# Patient Record
Sex: MALE | Employment: UNEMPLOYED | URBAN - METROPOLITAN AREA
[De-identification: names, ages, dates, MRNs, and addresses within clinical notes are randomized per-mention and may not be internally consistent; named-entity substitution may affect disease eponyms.]

---

## 2017-03-02 ENCOUNTER — HOSPITAL ENCOUNTER (EMERGENCY)
Age: 32
Discharge: LEFT BEFORE DISCHARGE | End: 2017-03-02
Attending: EMERGENCY MEDICINE
Payer: SELF-PAY

## 2017-03-02 DIAGNOSIS — Z53.21 PATIENT LEFT AFTER TRIAGE: Primary | ICD-10-CM

## 2017-03-02 PROCEDURE — 75810000275 HC EMERGENCY DEPT VISIT NO LEVEL OF CARE

## 2017-03-03 NOTE — ED PROVIDER NOTES
HPI     No past medical history on file. No past surgical history on file. No family history on file. Social History     Social History    Marital status: SINGLE     Spouse name: N/A    Number of children: N/A    Years of education: N/A     Occupational History    Not on file. Social History Main Topics    Smoking status: Current Every Day Smoker     Packs/day: 0.25    Smokeless tobacco: Not on file    Alcohol use Yes    Drug use: No    Sexual activity: Not on file     Other Topics Concern    Not on file     Social History Narrative    No narrative on file         ALLERGIES: Review of patient's allergies indicates no known allergies. Review of Systems    There were no vitals filed for this visit.          Physical Exam     MDM  ED Course       Procedures                       Left without being seen  Lissy Lozano MD

## 2019-11-06 ENCOUNTER — APPOINTMENT (OUTPATIENT)
Dept: GENERAL RADIOLOGY | Age: 34
DRG: 644 | End: 2019-11-06
Attending: PHYSICIAN ASSISTANT
Payer: SELF-PAY

## 2019-11-06 ENCOUNTER — HOSPITAL ENCOUNTER (INPATIENT)
Age: 34
LOS: 3 days | Discharge: HOME | DRG: 644 | End: 2019-11-09
Attending: EMERGENCY MEDICINE | Admitting: INTERNAL MEDICINE
Payer: SELF-PAY

## 2019-11-06 DIAGNOSIS — T50.905A ADVERSE EFFECT OF OVER-THE-COUNTER MEDICATION, INITIAL ENCOUNTER: ICD-10-CM

## 2019-11-06 DIAGNOSIS — E16.2 HYPOGLYCEMIA: Primary | ICD-10-CM

## 2019-11-06 PROBLEM — R77.8 ELEVATED TROPONIN: Status: ACTIVE | Noted: 2019-11-06

## 2019-11-06 PROBLEM — R74.8 ELEVATED CK: Status: ACTIVE | Noted: 2019-11-06

## 2019-11-06 PROBLEM — R74.8 ELEVATED CK-MB LEVEL: Status: ACTIVE | Noted: 2019-11-06

## 2019-11-06 PROBLEM — R56.9 SEIZURE-LIKE ACTIVITY (HCC): Status: ACTIVE | Noted: 2019-11-06

## 2019-11-06 LAB
ALBUMIN SERPL-MCNC: 3.8 G/DL (ref 3.4–5)
ALBUMIN/GLOB SERPL: 1.1 {RATIO} (ref 0.8–1.7)
ALP SERPL-CCNC: 55 U/L (ref 45–117)
ALT SERPL-CCNC: 25 U/L (ref 16–61)
AMPHET UR QL SCN: NEGATIVE
ANION GAP SERPL CALC-SCNC: 3 MMOL/L (ref 3–18)
APPEARANCE UR: CLEAR
AST SERPL-CCNC: 20 U/L (ref 10–38)
BACTERIA URNS QL MICRO: NEGATIVE /HPF
BARBITURATES UR QL SCN: NEGATIVE
BASOPHILS # BLD: 0 K/UL (ref 0–0.1)
BASOPHILS NFR BLD: 0 % (ref 0–2)
BENZODIAZ UR QL: NEGATIVE
BILIRUB SERPL-MCNC: 0.5 MG/DL (ref 0.2–1)
BILIRUB UR QL: NEGATIVE
BUN SERPL-MCNC: 7 MG/DL (ref 7–18)
BUN/CREAT SERPL: 7 (ref 12–20)
CALCIUM SERPL-MCNC: 9.4 MG/DL (ref 8.5–10.1)
CANNABINOIDS UR QL SCN: POSITIVE
CHLORIDE SERPL-SCNC: 106 MMOL/L (ref 100–111)
CK MB CFR SERPL CALC: 0.8 % (ref 0–4)
CK MB CFR SERPL CALC: 0.8 % (ref 0–4)
CK MB CFR SERPL CALC: 1 % (ref 0–4)
CK MB SERPL-MCNC: 3.6 NG/ML (ref 5–25)
CK MB SERPL-MCNC: 4.7 NG/ML (ref 5–25)
CK MB SERPL-MCNC: 9.1 NG/ML (ref 5–25)
CK SERPL-CCNC: 1178 U/L (ref 39–308)
CK SERPL-CCNC: 345 U/L (ref 39–308)
CK SERPL-CCNC: 576 U/L (ref 39–308)
CO2 SERPL-SCNC: 31 MMOL/L (ref 21–32)
COCAINE UR QL SCN: NEGATIVE
COLOR UR: YELLOW
CREAT SERPL-MCNC: 1.01 MG/DL (ref 0.6–1.3)
DIFFERENTIAL METHOD BLD: ABNORMAL
EOSINOPHIL # BLD: 0 K/UL (ref 0–0.4)
EOSINOPHIL NFR BLD: 0 % (ref 0–5)
EPITH CASTS URNS QL MICRO: NEGATIVE /LPF (ref 0–5)
ERYTHROCYTE [DISTWIDTH] IN BLOOD BY AUTOMATED COUNT: 14.2 % (ref 11.6–14.5)
ETHANOL SERPL-MCNC: <3 MG/DL (ref 0–3)
GLOBULIN SER CALC-MCNC: 3.5 G/DL (ref 2–4)
GLUCOSE BLD STRIP.AUTO-MCNC: 102 MG/DL (ref 70–110)
GLUCOSE BLD STRIP.AUTO-MCNC: 107 MG/DL (ref 70–110)
GLUCOSE BLD STRIP.AUTO-MCNC: 107 MG/DL (ref 70–110)
GLUCOSE BLD STRIP.AUTO-MCNC: 108 MG/DL (ref 70–110)
GLUCOSE BLD STRIP.AUTO-MCNC: 147 MG/DL (ref 70–110)
GLUCOSE BLD STRIP.AUTO-MCNC: 73 MG/DL (ref 70–110)
GLUCOSE BLD STRIP.AUTO-MCNC: 78 MG/DL (ref 70–110)
GLUCOSE BLD STRIP.AUTO-MCNC: 85 MG/DL (ref 70–110)
GLUCOSE BLD STRIP.AUTO-MCNC: 86 MG/DL (ref 70–110)
GLUCOSE BLD STRIP.AUTO-MCNC: 88 MG/DL (ref 70–110)
GLUCOSE BLD STRIP.AUTO-MCNC: 89 MG/DL (ref 70–110)
GLUCOSE SERPL-MCNC: 47 MG/DL (ref 74–99)
GLUCOSE UR STRIP.AUTO-MCNC: 500 MG/DL
HCT VFR BLD AUTO: 43.9 % (ref 36–48)
HDSCOM,HDSCOM: ABNORMAL
HGB BLD-MCNC: 15.1 G/DL (ref 13–16)
HGB UR QL STRIP: NEGATIVE
KETONES UR QL STRIP.AUTO: NEGATIVE MG/DL
LEUKOCYTE ESTERASE UR QL STRIP.AUTO: NEGATIVE
LYMPHOCYTES # BLD: 0.8 K/UL (ref 0.9–3.6)
LYMPHOCYTES NFR BLD: 10 % (ref 21–52)
MCH RBC QN AUTO: 29.2 PG (ref 24–34)
MCHC RBC AUTO-ENTMCNC: 34.4 G/DL (ref 31–37)
MCV RBC AUTO: 84.7 FL (ref 74–97)
METHADONE UR QL: NEGATIVE
MONOCYTES # BLD: 0.6 K/UL (ref 0.05–1.2)
MONOCYTES NFR BLD: 7 % (ref 3–10)
NEUTS SEG # BLD: 7.3 K/UL (ref 1.8–8)
NEUTS SEG NFR BLD: 83 % (ref 40–73)
NITRITE UR QL STRIP.AUTO: NEGATIVE
OPIATES UR QL: NEGATIVE
PCP UR QL: NEGATIVE
PH UR STRIP: 6 [PH] (ref 5–8)
PLATELET # BLD AUTO: 278 K/UL (ref 135–420)
PMV BLD AUTO: 9.8 FL (ref 9.2–11.8)
POTASSIUM SERPL-SCNC: 4 MMOL/L (ref 3.5–5.5)
PROT SERPL-MCNC: 7.3 G/DL (ref 6.4–8.2)
PROT UR STRIP-MCNC: 100 MG/DL
RBC # BLD AUTO: 5.18 M/UL (ref 4.7–5.5)
RBC #/AREA URNS HPF: NORMAL /HPF (ref 0–5)
SODIUM SERPL-SCNC: 140 MMOL/L (ref 136–145)
SP GR UR REFRACTOMETRY: 1.01 (ref 1–1.03)
TROPONIN I SERPL-MCNC: 0.11 NG/ML (ref 0–0.04)
TROPONIN I SERPL-MCNC: 0.26 NG/ML (ref 0–0.04)
TROPONIN I SERPL-MCNC: 0.28 NG/ML (ref 0–0.04)
UROBILINOGEN UR QL STRIP.AUTO: 0.2 EU/DL (ref 0.2–1)
WBC # BLD AUTO: 8.7 K/UL (ref 4.6–13.2)
WBC URNS QL MICRO: NORMAL /HPF (ref 0–4)

## 2019-11-06 PROCEDURE — 81001 URINALYSIS AUTO W/SCOPE: CPT

## 2019-11-06 PROCEDURE — 96365 THER/PROPH/DIAG IV INF INIT: CPT

## 2019-11-06 PROCEDURE — 80307 DRUG TEST PRSMV CHEM ANLYZR: CPT

## 2019-11-06 PROCEDURE — 93005 ELECTROCARDIOGRAM TRACING: CPT

## 2019-11-06 PROCEDURE — 74011250637 HC RX REV CODE- 250/637: Performed by: PHYSICIAN ASSISTANT

## 2019-11-06 PROCEDURE — 85025 COMPLETE CBC W/AUTO DIFF WBC: CPT

## 2019-11-06 PROCEDURE — 74011000258 HC RX REV CODE- 258: Performed by: PHYSICIAN ASSISTANT

## 2019-11-06 PROCEDURE — 74011000250 HC RX REV CODE- 250: Performed by: EMERGENCY MEDICINE

## 2019-11-06 PROCEDURE — 80053 COMPREHEN METABOLIC PANEL: CPT

## 2019-11-06 PROCEDURE — 74011250636 HC RX REV CODE- 250/636: Performed by: INTERNAL MEDICINE

## 2019-11-06 PROCEDURE — 99285 EMERGENCY DEPT VISIT HI MDM: CPT

## 2019-11-06 PROCEDURE — 74011000250 HC RX REV CODE- 250: Performed by: PHYSICIAN ASSISTANT

## 2019-11-06 PROCEDURE — 74011250636 HC RX REV CODE- 250/636: Performed by: PHYSICIAN ASSISTANT

## 2019-11-06 PROCEDURE — 82550 ASSAY OF CK (CPK): CPT

## 2019-11-06 PROCEDURE — 96375 TX/PRO/DX INJ NEW DRUG ADDON: CPT

## 2019-11-06 PROCEDURE — 71045 X-RAY EXAM CHEST 1 VIEW: CPT

## 2019-11-06 PROCEDURE — 65660000004 HC RM CVT STEPDOWN

## 2019-11-06 PROCEDURE — 82962 GLUCOSE BLOOD TEST: CPT

## 2019-11-06 PROCEDURE — 74011000250 HC RX REV CODE- 250

## 2019-11-06 PROCEDURE — 36415 COLL VENOUS BLD VENIPUNCTURE: CPT

## 2019-11-06 PROCEDURE — 74011000258 HC RX REV CODE- 258: Performed by: INTERNAL MEDICINE

## 2019-11-06 RX ORDER — MAGNESIUM SULFATE 100 %
4 CRYSTALS MISCELLANEOUS AS NEEDED
Status: DISCONTINUED | OUTPATIENT
Start: 2019-11-06 | End: 2019-11-09 | Stop reason: HOSPADM

## 2019-11-06 RX ORDER — HYDROCORTISONE SODIUM SUCCINATE 100 MG/2ML
100 INJECTION, POWDER, FOR SOLUTION INTRAMUSCULAR; INTRAVENOUS ONCE
Status: COMPLETED | OUTPATIENT
Start: 2019-11-06 | End: 2019-11-06

## 2019-11-06 RX ORDER — GUAIFENESIN 100 MG/5ML
324 LIQUID (ML) ORAL
Status: COMPLETED | OUTPATIENT
Start: 2019-11-06 | End: 2019-11-06

## 2019-11-06 RX ORDER — DEXTROSE 50 % IN WATER (D50W) INTRAVENOUS SYRINGE
25
Status: COMPLETED | OUTPATIENT
Start: 2019-11-06 | End: 2019-11-06

## 2019-11-06 RX ORDER — ENOXAPARIN SODIUM 100 MG/ML
40 INJECTION SUBCUTANEOUS EVERY 24 HOURS
Status: DISCONTINUED | OUTPATIENT
Start: 2019-11-07 | End: 2019-11-09 | Stop reason: HOSPADM

## 2019-11-06 RX ORDER — NALOXONE HYDROCHLORIDE 0.4 MG/ML
0.4 INJECTION, SOLUTION INTRAMUSCULAR; INTRAVENOUS; SUBCUTANEOUS AS NEEDED
Status: DISCONTINUED | OUTPATIENT
Start: 2019-11-06 | End: 2019-11-09 | Stop reason: HOSPADM

## 2019-11-06 RX ORDER — DEXTROSE MONOHYDRATE 100 MG/ML
150 INJECTION, SOLUTION INTRAVENOUS CONTINUOUS
Status: DISCONTINUED | OUTPATIENT
Start: 2019-11-06 | End: 2019-11-08

## 2019-11-06 RX ORDER — ACETAMINOPHEN 325 MG/1
650 TABLET ORAL
Status: DISCONTINUED | OUTPATIENT
Start: 2019-11-06 | End: 2019-11-09 | Stop reason: HOSPADM

## 2019-11-06 RX ORDER — DEXTROSE MONOHYDRATE 100 MG/ML
125-250 INJECTION, SOLUTION INTRAVENOUS AS NEEDED
Status: DISCONTINUED | OUTPATIENT
Start: 2019-11-06 | End: 2019-11-09 | Stop reason: HOSPADM

## 2019-11-06 RX ORDER — DEXTROSE 50 % IN WATER (D50W) INTRAVENOUS SYRINGE
25
Status: DISCONTINUED | OUTPATIENT
Start: 2019-11-06 | End: 2019-11-06

## 2019-11-06 RX ORDER — WATER FOR INJECTION,STERILE
VIAL (ML) INJECTION
Status: COMPLETED
Start: 2019-11-06 | End: 2019-11-06

## 2019-11-06 RX ORDER — DOCUSATE SODIUM 100 MG/1
100 CAPSULE, LIQUID FILLED ORAL
Status: DISCONTINUED | OUTPATIENT
Start: 2019-11-06 | End: 2019-11-09 | Stop reason: HOSPADM

## 2019-11-06 RX ORDER — DEXTROSE 50 % IN WATER (D50W) INTRAVENOUS SYRINGE
25-50 AS NEEDED
Status: DISCONTINUED | OUTPATIENT
Start: 2019-11-06 | End: 2019-11-06

## 2019-11-06 RX ORDER — GUAIFENESIN 100 MG/5ML
81 LIQUID (ML) ORAL DAILY
Status: DISCONTINUED | OUTPATIENT
Start: 2019-11-07 | End: 2019-11-07

## 2019-11-06 RX ORDER — ONDANSETRON 2 MG/ML
4 INJECTION INTRAMUSCULAR; INTRAVENOUS
Status: DISCONTINUED | OUTPATIENT
Start: 2019-11-06 | End: 2019-11-09 | Stop reason: HOSPADM

## 2019-11-06 RX ORDER — ENOXAPARIN SODIUM 100 MG/ML
1 INJECTION SUBCUTANEOUS
Status: DISCONTINUED | OUTPATIENT
Start: 2019-11-06 | End: 2019-11-06

## 2019-11-06 RX ORDER — ENOXAPARIN SODIUM 100 MG/ML
80 INJECTION SUBCUTANEOUS
Status: COMPLETED | OUTPATIENT
Start: 2019-11-06 | End: 2019-11-06

## 2019-11-06 RX ADMIN — DEXTROSE MONOHYDRATE 150 ML/HR: 10 INJECTION, SOLUTION INTRAVENOUS at 16:35

## 2019-11-06 RX ADMIN — WATER 1 ML: 1 INJECTION INTRAMUSCULAR; INTRAVENOUS; SUBCUTANEOUS at 23:00

## 2019-11-06 RX ADMIN — DEXTROSE 50 % IN WATER (D50W) INTRAVENOUS SYRINGE 25 G: at 16:23

## 2019-11-06 RX ADMIN — HYDROCORTISONE SODIUM SUCCINATE 100 MG: 100 INJECTION, POWDER, FOR SOLUTION INTRAMUSCULAR; INTRAVENOUS at 22:45

## 2019-11-06 RX ADMIN — DEXTROSE 50 % IN WATER (D50W) INTRAVENOUS SYRINGE 25 G: at 18:50

## 2019-11-06 RX ADMIN — ENOXAPARIN SODIUM 80 MG: 80 INJECTION SUBCUTANEOUS at 19:10

## 2019-11-06 RX ADMIN — ASPIRIN 81 MG 324 MG: 81 TABLET ORAL at 19:10

## 2019-11-06 RX ADMIN — GLUCAGON HYDROCHLORIDE 1 MG: 1 INJECTION, POWDER, FOR SOLUTION INTRAMUSCULAR; INTRAVENOUS; SUBCUTANEOUS at 22:37

## 2019-11-06 RX ADMIN — DEXTROSE MONOHYDRATE 200 ML/HR: 10 INJECTION, SOLUTION INTRAVENOUS at 22:00

## 2019-11-06 NOTE — ED NOTES
TRANSFER - OUT REPORT:    Verbal report given to Aurelio Connelly RN(name) on Lokesh Torres  being transferred to [facility](unit) for routine progression of care       Report consisted of patients Situation, Background, Assessment and   Recommendations(SBAR). Information from the following report(s) SBAR, ED Summary, Intake/Output and MAR was reviewed with the receiving nurse. Lines:   Peripheral IV 11/06/19 Left Antecubital (Active)   Site Assessment Clean, dry, & intact 11/6/2019  4:30 PM   Phlebitis Assessment 0 11/6/2019  4:30 PM   Infiltration Assessment 0 11/6/2019  4:30 PM   Dressing Status Clean, dry, & intact 11/6/2019  4:30 PM   Dressing Type Transparent 11/6/2019  4:30 PM   Hub Color/Line Status Pink 11/6/2019  4:30 PM       Peripheral IV 11/06/19 Right Antecubital (Active)   Site Assessment Clean, dry, & intact 11/6/2019  4:31 PM   Phlebitis Assessment 0 11/6/2019  4:31 PM   Infiltration Assessment 0 11/6/2019  4:31 PM   Dressing Type Transparent 11/6/2019  4:31 PM   Hub Color/Line Status Pink 11/6/2019  4:31 PM        Opportunity for questions and clarification was provided.       Patient transported with:   Monitor

## 2019-11-06 NOTE — PROGRESS NOTES
[address] [facility]  Report received from Laura Felix RN. POC reviewed with pt at bedside. normosol infusing 125ml. hr  BG have been stable and pt has a CK waiting to be resulted. 1102  Pt ambulated over 500 ft without any difficulties. VSS stable t/o and no complaints of lightheadedness or dizziness. Pt educated on signs and symptoms of hypoglycemia. Will continue to monitor pt's BG Q4H.    1240  OK for pt to be discharged from hospital per Dr. Sreedhar De La Cruz. 1300  Discharge instructions reviewed with pt. Per Dr. Sreedhar De La Cruz, pt is to follow up with [facility] free clinic in [place]. Extensive education provided to patient and significant other regarding hypoglycemia and on Lovenox shot. Pt understanding and is ready for discharge. 1330  Pt awaiting transport to come .

## 2019-11-06 NOTE — ED PROVIDER NOTES
EMERGENCY DEPARTMENT HISTORY AND PHYSICAL EXAM    4:06 PM      Date: 2019  Patient Name: Felipe Gregg    History of Presenting Illness     Chief Complaint   Patient presents with    Low Blood Sugar       History Provided By: Patient, Patient's Wife and EMS    Chief Complaint: AMS, shaking, low blood sugar  Duration:  Minutes  Timing:  Acute  Location:   Quality: N/A  Severity: N/A  Modifying Factors: none  Associated Symptoms: denies any other associated signs or symptoms      Additional History (Context): Felipe Gregg is a 29 y.o. male who presents from home via EMS to the emergency department for evaluation of AMS and an episode of generalized shaking. Pt's significant other walked in the door to find him lying on the floor, trembling all over. He was unable to respond. Pt was found by EMS to have a BG in the 40s. After an amp of D50, BG climbed to 203 and patient became responsive. He has no history of DM. Pt now states that he took one over the counter male enhancement drug called V8 1.5-2 days ago. He reports he read online that the drug can cause low blood sugar, dizziness, and trembling, but states he took it anyway. He has been battling dizziness, lightheadedness, shakiness, and lack of appetite ever since taking the medication. He admits to regular THC use, but no other illicit drug use. Denies ETOH abuse. Pt also reports he has no medical problems and takes no other OTC or Rx medications. He has been healthy recently. He now has no physical complaints. PCP:  None      Past History     Past Medical History:  History reviewed. No pertinent past medical history. Past Surgical History:  History reviewed. No pertinent surgical history. Family History:  History reviewed. No pertinent family history.     Social History:  Social History     Tobacco Use    Smoking status: Former Smoker     Packs/day: 0.25     Last attempt to quit: 10/1/2019     Years since quittin.0    Smokeless tobacco: Never Used   Substance Use Topics    Alcohol use: Yes    Drug use: No       Allergies:  No Known Allergies      Review of Systems       Review of Systems   Constitutional: Negative for chills and fever. HENT: Negative for congestion, rhinorrhea and sore throat. Respiratory: Negative for cough and shortness of breath. Cardiovascular: Negative for chest pain. Gastrointestinal: Negative for abdominal pain, blood in stool, constipation, diarrhea, nausea and vomiting. Genitourinary: Negative for dysuria, frequency and hematuria. Musculoskeletal: Negative for back pain and myalgias. Skin: Negative for rash and wound. Neurological: Positive for dizziness, tremors, syncope, weakness and light-headedness. Negative for headaches. Psychiatric/Behavioral: Positive for confusion. All other systems reviewed and are negative. Physical Exam     Visit Vitals  /77   Pulse 61   Temp 97.3 °F (36.3 °C)   Resp 15   Ht 6' (1.829 m)   Wt 79.4 kg (175 lb)   SpO2 100%   BMI 23.73 kg/m²       Physical Exam   Constitutional: He is oriented to person, place, and time. He appears well-developed and well-nourished. No distress. HENT:   Head: Normocephalic and atraumatic. Nose: Nose normal.   Mouth/Throat: Oropharynx is clear and moist. No oropharyngeal exudate. Eyes: Pupils are equal, round, and reactive to light. Conjunctivae and EOM are normal. Right eye exhibits no discharge. Left eye exhibits no discharge. Neck: Normal range of motion. Neck supple. No thyromegaly present. Cardiovascular: Normal rate, regular rhythm and normal heart sounds. Exam reveals no gallop and no friction rub. No murmur heard. Pulmonary/Chest: Effort normal and breath sounds normal. No respiratory distress. He has no wheezes. He has no rales. He exhibits no tenderness. Lungs CTAB   Abdominal: Soft. Bowel sounds are normal. He exhibits no distension. There is no tenderness. There is no rebound and no guarding. Musculoskeletal: Normal range of motion. He exhibits no edema or deformity. Lymphadenopathy:     He has no cervical adenopathy. Neurological: He is alert and oriented to person, place, and time. No cranial nerve deficit. Skin: Skin is warm and dry. No rash noted. He is not diaphoretic. Psychiatric: He has a normal mood and affect. Nursing note and vitals reviewed. Diagnostic Study Results     Labs -  Recent Results (from the past 12 hour(s))   GLUCOSE, POC    Collection Time: 11/06/19  3:53 PM   Result Value Ref Range    Glucose (POC) 86 70 - 110 mg/dL   GLUCOSE, POC    Collection Time: 11/06/19  4:16 PM   Result Value Ref Range    Glucose (POC) 73 70 - 110 mg/dL   EKG, 12 LEAD, INITIAL    Collection Time: 11/06/19  4:27 PM   Result Value Ref Range    Ventricular Rate 53 BPM    Atrial Rate 53 BPM    P-R Interval 184 ms    QRS Duration 100 ms    Q-T Interval 400 ms    QTC Calculation (Bezet) 375 ms    Calculated P Axis 97 degrees    Calculated R Axis 92 degrees    Calculated T Axis 72 degrees    Diagnosis       Suspect arm lead reversal, interpretation assumes no reversal  Sinus bradycardia  Rightward axis  Incomplete right bundle branch block  Borderline ECG  When compared with ECG of 22-AUG-2013 15:23,  WI interval has decreased     CBC WITH AUTOMATED DIFF    Collection Time: 11/06/19  4:30 PM   Result Value Ref Range    WBC 8.7 4.6 - 13.2 K/uL    RBC 5.18 4.70 - 5.50 M/uL    HGB 15.1 13.0 - 16.0 g/dL    HCT 43.9 36.0 - 48.0 %    MCV 84.7 74.0 - 97.0 FL    MCH 29.2 24.0 - 34.0 PG    MCHC 34.4 31.0 - 37.0 g/dL    RDW 14.2 11.6 - 14.5 %    PLATELET 760 422 - 134 K/uL    MPV 9.8 9.2 - 11.8 FL    NEUTROPHILS 83 (H) 40 - 73 %    LYMPHOCYTES 10 (L) 21 - 52 %    MONOCYTES 7 3 - 10 %    EOSINOPHILS 0 0 - 5 %    BASOPHILS 0 0 - 2 %    ABS. NEUTROPHILS 7.3 1.8 - 8.0 K/UL    ABS. LYMPHOCYTES 0.8 (L) 0.9 - 3.6 K/UL    ABS. MONOCYTES 0.6 0.05 - 1.2 K/UL    ABS. EOSINOPHILS 0.0 0.0 - 0.4 K/UL    ABS.  BASOPHILS 0.0 0.0 - 0.1 K/UL    DF AUTOMATED     METABOLIC PANEL, COMPREHENSIVE    Collection Time: 11/06/19  4:30 PM   Result Value Ref Range    Sodium 140 136 - 145 mmol/L    Potassium 4.0 3.5 - 5.5 mmol/L    Chloride 106 100 - 111 mmol/L    CO2 31 21 - 32 mmol/L    Anion gap 3 3.0 - 18 mmol/L    Glucose 47 (LL) 74 - 99 mg/dL    BUN 7 7.0 - 18 MG/DL    Creatinine 1.01 0.6 - 1.3 MG/DL    BUN/Creatinine ratio 7 (L) 12 - 20      GFR est AA >60 >60 ml/min/1.73m2    GFR est non-AA >60 >60 ml/min/1.73m2    Calcium 9.4 8.5 - 10.1 MG/DL    Bilirubin, total 0.5 0.2 - 1.0 MG/DL    ALT (SGPT) 25 16 - 61 U/L    AST (SGOT) 20 10 - 38 U/L    Alk. phosphatase 55 45 - 117 U/L    Protein, total 7.3 6.4 - 8.2 g/dL    Albumin 3.8 3.4 - 5.0 g/dL    Globulin 3.5 2.0 - 4.0 g/dL    A-G Ratio 1.1 0.8 - 1.7     ETHYL ALCOHOL    Collection Time: 11/06/19  4:30 PM   Result Value Ref Range    ALCOHOL(ETHYL),SERUM <3 0 - 3 MG/DL   CARDIAC PANEL,(CK, CKMB & TROPONIN)    Collection Time: 11/06/19  4:30 PM   Result Value Ref Range     (H) 39 - 308 U/L    CK - MB 3.6 (H) <3.6 ng/ml    CK-MB Index 1.0 0.0 - 4.0 %    Troponin-I, QT 0.11 (H) 0.0 - 0.045 NG/ML   GLUCOSE, POC    Collection Time: 11/06/19  5:06 PM   Result Value Ref Range    Glucose (POC) 102 70 - 110 mg/dL   GLUCOSE, POC    Collection Time: 11/06/19  5:50 PM   Result Value Ref Range    Glucose (POC) 85 70 - 110 mg/dL       Radiologic Studies -   No results found. Medical Decision Making   I am the first provider for this patient. I reviewed the vital signs, available nursing notes, past medical history, past surgical history, family history and social history. Vital Signs-Reviewed the patient's vital signs. Pulse Oximetry Analysis -  100% on room air (Interpretation)    Records Reviewed: Nursing Notes (Time of Review: 4:06 PM)    ED Course: Progress Notes, Reevaluation, and Consults:  0418PM:  Received report from RN. Pt has been eating since his arrival in the ED.   BG dropped from 203 (en route) to 86 upon arrival here in the ED. After consuming food, BG has further dropped to 73. Discussed with Dr. Lori Gutierrez. Will order an additional bolus of D50 and start a D10 drip. Plan to admit. Discussed plan with patient and family, who are in agreement. Labs pending. Ayad Mensah PA-C    0501PMPerla Spurling, RN, received call from Lab indicating serum glucose is 47. Pt is now on a D10 drip and was in the process of receiving an additional D50 bolus at the time this blood was drawn. Ayad Mensah PA-C    8605WJ: Pt is 40min s/p D50 with ongoing D10 gtt. BG at this time is 102. Pt denies any symptoms currently. Still awaiting remainder or labs and CXR. After these have resulted, will call for admission. Ayad Mensah PA-C    1449 PM:  Discussed case with Dr. Valeri Last, hospitalist.  Standard discussion including HPI, ROS, exam findings, and available labs and diagnostic testing. She accepts admission. She recommends trending of enzymes and contacting poison control. Ayad Mensah PA-C    5:30 PM:  Discussed case with poison control. Their recommendations are as follows:    -Dextrose infusion may be started if persistent hypoglycemia requires multiple doses of D50  -D50 1ml/kg   -D5 or D10 in PIV - start at maintenance rate and titrate to BG >60.  -Octreotide only in sulfonylurea hypoglycemia - 50-100mcg q6h (Sc or IV) to maintain >60 and pt is asymptomatic   -Otherwise just symptomatic and supportive care and keep patient eating Ayad Mensah PA-C      Provider Notes (Medical Decision Making):   differential diagnosis: Medication adverse effect, decreased PO intake, DM, insulinoma, exogenous insulin    Plan:  Pt presents via EMS in NAD, vitals wnl. Hypoglycemia had already been corrected en route with D50. Pt subsequently A&O x 4.  Etiology of hypoglycemia is likely exogenous - confirmed recent dose of V8 OTC male enhancement drug.   According to a recent article from the [facility] [place] [address] that the active ingredients of the V8 male enhancement drug are still under investigation, but several ingredients have been identified that can cause hypoglycemia. BG continuing to drop rapidly - initiated D10 gtt and additional D50 bolus. EKG without STEMI. Troponin mildly bumped at 0.11 - possibly related to demand and persistent hypoglycemia. Unremarkable portable chest XR. CBC and CMP otherwise unremarkable. ETOH negative. UA and UDS pending at time of admission. See discussion with Poison Control above. Diagnosis     Clinical Impression:   1. Hypoglycemia    2. Adverse effect of over-the-counter medication, initial encounter        Disposition: Admit    Follow-up Information    None          Patient's Medications   Start Taking    No medications on file   Continue Taking    No medications on file   These Medications have changed    No medications on file   Stop Taking    OXYCODONE-ACETAMINOPHEN (PERCOCET) 5-325 MG PER TABLET    Take 1 Tab by mouth two (2) times a day. Max Daily Amount: 2 Tabs. PHENOL THROAT SPRAY (CHLORASEPTIC THROAT SPRAY) 1.4 % SPRAY    Take 1 Spray by mouth as needed. _______________________________    This note was dictated utilizing voice recognition software which may lead to typographical errors. I apologize in advance if the situation occurs. If questions arise please do not hesitate to contact me or call our department.   Marisabel Mcduffie PA-C

## 2019-11-06 NOTE — ED NOTES
Pt was given a urinal and instructed to collect urine specimen when he is able. He verbalized understanding. Pt left lying on stretcher awake & alert. Bed in lowest position, call bell within easy reach. SR up x 2 for safety. Pt instructed to call for assistance as needed; he verbalized understanding.

## 2019-11-06 NOTE — ED TRIAGE NOTES
Pt transported via EMS [facility] for evaluation of hypoglycemia; per EMS pt's girlfriend states pt had a seizure and altered mental status; glucose for EMS 45mg/dl; given 1 amp of D50 and recheck was 203mg/dl. Pt states he took V8 male enhancement pills 2 nights ago. Upon arrival pt is alert & oriented x 4.

## 2019-11-06 NOTE — ED TRIAGE NOTES
Pt encouraged to  Continue to eat and drink, EKG repeated and repeat labs drawn. Pt remains alert and oriented x 4. Pt states he is tired and is not hungry,  dicussed the need for increased calories with  Pt.  Pt verbalized understanding

## 2019-11-07 ENCOUNTER — APPOINTMENT (OUTPATIENT)
Dept: NON INVASIVE DIAGNOSTICS | Age: 34
DRG: 644 | End: 2019-11-07
Attending: INTERNAL MEDICINE
Payer: SELF-PAY

## 2019-11-07 LAB
ANION GAP SERPL CALC-SCNC: 4 MMOL/L (ref 3–18)
ATRIAL RATE: 53 BPM
ATRIAL RATE: 57 BPM
ATRIAL RATE: 65 BPM
BASOPHILS # BLD: 0 K/UL (ref 0–0.1)
BASOPHILS NFR BLD: 0 % (ref 0–2)
BUN SERPL-MCNC: 7 MG/DL (ref 7–18)
BUN/CREAT SERPL: 6 (ref 12–20)
CALCIUM SERPL-MCNC: 9.3 MG/DL (ref 8.5–10.1)
CALCULATED P AXIS, ECG09: 57 DEGREES
CALCULATED P AXIS, ECG09: 65 DEGREES
CALCULATED P AXIS, ECG09: 97 DEGREES
CALCULATED R AXIS, ECG10: 80 DEGREES
CALCULATED R AXIS, ECG10: 92 DEGREES
CALCULATED R AXIS, ECG10: 96 DEGREES
CALCULATED T AXIS, ECG11: 71 DEGREES
CALCULATED T AXIS, ECG11: 72 DEGREES
CALCULATED T AXIS, ECG11: 72 DEGREES
CHLORIDE SERPL-SCNC: 107 MMOL/L (ref 100–111)
CHOLEST SERPL-MCNC: 146 MG/DL
CK MB CFR SERPL CALC: 0.6 % (ref 0–4)
CK MB CFR SERPL CALC: 0.8 % (ref 0–4)
CK MB SERPL-MCNC: 11.7 NG/ML (ref 5–25)
CK MB SERPL-MCNC: 12.8 NG/ML (ref 5–25)
CK SERPL-CCNC: 1649 U/L (ref 39–308)
CK SERPL-CCNC: 1929 U/L (ref 39–308)
CO2 SERPL-SCNC: 27 MMOL/L (ref 21–32)
CREAT SERPL-MCNC: 1.08 MG/DL (ref 0.6–1.3)
DIAGNOSIS, 93000: NORMAL
DIFFERENTIAL METHOD BLD: ABNORMAL
ECHO AO ROOT DIAM: 2.23 CM
ECHO LA AREA 4C: 12.2 CM2
ECHO LA VOL 2C: 23.75 ML (ref 18–58)
ECHO LA VOL 4C: 23.72 ML (ref 18–58)
ECHO LA VOL BP: 30.79 ML (ref 18–58)
ECHO LA VOL/BSA BIPLANE: 15.3 ML/M2 (ref 16–28)
ECHO LA VOLUME INDEX A2C: 11.8 ML/M2 (ref 16–28)
ECHO LA VOLUME INDEX A4C: 11.78 ML/M2 (ref 16–28)
ECHO LV E' LATERAL VELOCITY: 14.26 CM/S
ECHO LV EDV A2C: 85.4 ML
ECHO LV EDV A4C: 82.3 ML
ECHO LV EDV BP: 84 ML (ref 67–155)
ECHO LV EDV INDEX A4C: 40.9 ML/M2
ECHO LV EDV INDEX BP: 41.7 ML/M2
ECHO LV EDV NDEX A2C: 42.4 ML/M2
ECHO LV EDV TEICHHOLZ: 33.48 ML
ECHO LV EJECTION FRACTION A2C: 55 %
ECHO LV EJECTION FRACTION A4C: 60 %
ECHO LV EJECTION FRACTION BIPLANE: 56.6 % (ref 55–100)
ECHO LV ESV A2C: 38.3 ML
ECHO LV ESV A4C: 33.1 ML
ECHO LV ESV BP: 36.4 ML (ref 22–58)
ECHO LV ESV INDEX A2C: 19 ML/M2
ECHO LV ESV INDEX A4C: 16.4 ML/M2
ECHO LV ESV INDEX BP: 18.1 ML/M2
ECHO LV ESV TEICHHOLZ: 20.5 ML
ECHO LV INTERNAL DIMENSION DIASTOLIC: 3.93 CM (ref 4.2–5.9)
ECHO LV INTERNAL DIMENSION SYSTOLIC: 3.21 CM
ECHO LV IVSD: 0.64 CM (ref 0.6–1)
ECHO LV MASS 2D: 109.7 G (ref 88–224)
ECHO LV MASS INDEX 2D: 54.5 G/M2 (ref 49–115)
ECHO LV POSTERIOR WALL DIASTOLIC: 1.07 CM (ref 0.6–1)
ECHO LVOT DIAM: 1.75 CM
ECHO LVOT PEAK GRADIENT: 3.7 MMHG
ECHO LVOT PEAK VELOCITY: 95.98 CM/S
ECHO LVOT VTI: 17.83 CM
ECHO MV A VELOCITY: 63.88 CM/S
ECHO MV E DECELERATION TIME (DT): 180.1 MS
ECHO MV E VELOCITY: 51.4 CM/S
ECHO MV E/A RATIO: 0.8
ECHO MV E/E' LATERAL: 3.6
ECHO RV TAPSE: 1.77 CM (ref 1.5–2)
ECHO TV REGURGITANT MAX VELOCITY: 164.34 CM/S
ECHO TV REGURGITANT PEAK GRADIENT: 10.8 MMHG
EOSINOPHIL # BLD: 0 K/UL (ref 0–0.4)
EOSINOPHIL NFR BLD: 0 % (ref 0–5)
ERYTHROCYTE [DISTWIDTH] IN BLOOD BY AUTOMATED COUNT: 14.5 % (ref 11.6–14.5)
GLUCOSE BLD STRIP.AUTO-MCNC: 100 MG/DL (ref 70–110)
GLUCOSE BLD STRIP.AUTO-MCNC: 101 MG/DL (ref 70–110)
GLUCOSE BLD STRIP.AUTO-MCNC: 102 MG/DL (ref 70–110)
GLUCOSE BLD STRIP.AUTO-MCNC: 105 MG/DL (ref 70–110)
GLUCOSE BLD STRIP.AUTO-MCNC: 107 MG/DL (ref 70–110)
GLUCOSE BLD STRIP.AUTO-MCNC: 114 MG/DL (ref 70–110)
GLUCOSE BLD STRIP.AUTO-MCNC: 119 MG/DL (ref 70–110)
GLUCOSE BLD STRIP.AUTO-MCNC: 121 MG/DL (ref 70–110)
GLUCOSE BLD STRIP.AUTO-MCNC: 125 MG/DL (ref 70–110)
GLUCOSE BLD STRIP.AUTO-MCNC: 128 MG/DL (ref 70–110)
GLUCOSE BLD STRIP.AUTO-MCNC: 133 MG/DL (ref 70–110)
GLUCOSE BLD STRIP.AUTO-MCNC: 137 MG/DL (ref 70–110)
GLUCOSE BLD STRIP.AUTO-MCNC: 145 MG/DL (ref 70–110)
GLUCOSE BLD STRIP.AUTO-MCNC: 149 MG/DL (ref 70–110)
GLUCOSE BLD STRIP.AUTO-MCNC: 154 MG/DL (ref 70–110)
GLUCOSE BLD STRIP.AUTO-MCNC: 159 MG/DL (ref 70–110)
GLUCOSE BLD STRIP.AUTO-MCNC: 161 MG/DL (ref 70–110)
GLUCOSE BLD STRIP.AUTO-MCNC: 84 MG/DL (ref 70–110)
GLUCOSE BLD STRIP.AUTO-MCNC: 89 MG/DL (ref 70–110)
GLUCOSE SERPL-MCNC: 114 MG/DL (ref 74–99)
HCT VFR BLD AUTO: 43 % (ref 36–48)
HDLC SERPL-MCNC: 79 MG/DL (ref 40–60)
HDLC SERPL: 1.8 {RATIO} (ref 0–5)
HGB BLD-MCNC: 15.2 G/DL (ref 13–16)
LDLC SERPL CALC-MCNC: 56.4 MG/DL (ref 0–100)
LIPID PROFILE,FLP: ABNORMAL
LVFS 2D: 18.46 %
LVOT MG: 1.76 MMHG
LVOT MV: 0.6 CM/S
LVSV (MOD BI): 23.67 ML
LVSV (MOD SINGLE 4C): 24.5 ML
LVSV (MOD SINGLE): 23.47 ML
LVSV (TEICH): 12.98 ML
LYMPHOCYTES # BLD: 1.3 K/UL (ref 0.9–3.6)
LYMPHOCYTES NFR BLD: 10 % (ref 21–52)
MAGNESIUM SERPL-MCNC: 2.1 MG/DL (ref 1.6–2.6)
MCH RBC QN AUTO: 29.8 PG (ref 24–34)
MCHC RBC AUTO-ENTMCNC: 35.3 G/DL (ref 31–37)
MCV RBC AUTO: 84.3 FL (ref 74–97)
MONOCYTES # BLD: 0.4 K/UL (ref 0.05–1.2)
MONOCYTES NFR BLD: 3 % (ref 3–10)
MV DEC SLOPE: 2.85
NEUTS SEG # BLD: 11.2 K/UL (ref 1.8–8)
NEUTS SEG NFR BLD: 87 % (ref 40–73)
P-R INTERVAL, ECG05: 184 MS
P-R INTERVAL, ECG05: 220 MS
P-R INTERVAL, ECG05: 240 MS
PHOSPHATE SERPL-MCNC: 1.5 MG/DL (ref 2.5–4.9)
PLATELET # BLD AUTO: 259 K/UL (ref 135–420)
PMV BLD AUTO: 10.8 FL (ref 9.2–11.8)
POTASSIUM SERPL-SCNC: 4.4 MMOL/L (ref 3.5–5.5)
Q-T INTERVAL, ECG07: 368 MS
Q-T INTERVAL, ECG07: 394 MS
Q-T INTERVAL, ECG07: 400 MS
QRS DURATION, ECG06: 100 MS
QRS DURATION, ECG06: 110 MS
QRS DURATION, ECG06: 88 MS
QTC CALCULATION (BEZET), ECG08: 375 MS
QTC CALCULATION (BEZET), ECG08: 382 MS
QTC CALCULATION (BEZET), ECG08: 383 MS
RBC # BLD AUTO: 5.1 M/UL (ref 4.7–5.5)
SODIUM SERPL-SCNC: 138 MMOL/L (ref 136–145)
TRIGL SERPL-MCNC: 53 MG/DL (ref ?–150)
TROPONIN I SERPL-MCNC: 0.05 NG/ML (ref 0–0.04)
TROPONIN I SERPL-MCNC: 0.1 NG/ML (ref 0–0.04)
VENTRICULAR RATE, ECG03: 53 BPM
VENTRICULAR RATE, ECG03: 57 BPM
VENTRICULAR RATE, ECG03: 65 BPM
VLDLC SERPL CALC-MCNC: 10.6 MG/DL
WBC # BLD AUTO: 12.8 K/UL (ref 4.6–13.2)

## 2019-11-07 PROCEDURE — 74011250636 HC RX REV CODE- 250/636: Performed by: INTERNAL MEDICINE

## 2019-11-07 PROCEDURE — 80048 BASIC METABOLIC PNL TOTAL CA: CPT

## 2019-11-07 PROCEDURE — 93005 ELECTROCARDIOGRAM TRACING: CPT

## 2019-11-07 PROCEDURE — 93306 TTE W/DOPPLER COMPLETE: CPT

## 2019-11-07 PROCEDURE — 82962 GLUCOSE BLOOD TEST: CPT

## 2019-11-07 PROCEDURE — 36415 COLL VENOUS BLD VENIPUNCTURE: CPT

## 2019-11-07 PROCEDURE — 74011250636 HC RX REV CODE- 250/636: Performed by: HOSPITALIST

## 2019-11-07 PROCEDURE — 84100 ASSAY OF PHOSPHORUS: CPT

## 2019-11-07 PROCEDURE — 85025 COMPLETE CBC W/AUTO DIFF WBC: CPT

## 2019-11-07 PROCEDURE — 82550 ASSAY OF CK (CPK): CPT

## 2019-11-07 PROCEDURE — 74011000258 HC RX REV CODE- 258: Performed by: INTERNAL MEDICINE

## 2019-11-07 PROCEDURE — 83735 ASSAY OF MAGNESIUM: CPT

## 2019-11-07 PROCEDURE — 65660000004 HC RM CVT STEPDOWN

## 2019-11-07 PROCEDURE — 80061 LIPID PANEL: CPT

## 2019-11-07 RX ORDER — OCTREOTIDE ACETATE 50 UG/ML
50 INJECTION, SOLUTION INTRAVENOUS; SUBCUTANEOUS EVERY 6 HOURS
Status: DISCONTINUED | OUTPATIENT
Start: 2019-11-07 | End: 2019-11-08

## 2019-11-07 RX ORDER — HYDROCORTISONE SODIUM SUCCINATE 100 MG/2ML
50 INJECTION, POWDER, FOR SOLUTION INTRAMUSCULAR; INTRAVENOUS EVERY 8 HOURS
Status: DISCONTINUED | OUTPATIENT
Start: 2019-11-07 | End: 2019-11-07

## 2019-11-07 RX ADMIN — DEXTROSE MONOHYDRATE 200 ML/HR: 10 INJECTION, SOLUTION INTRAVENOUS at 02:47

## 2019-11-07 RX ADMIN — OCTREOTIDE ACETATE 50 MCG: 50 INJECTION, SOLUTION INTRAVENOUS; SUBCUTANEOUS at 20:10

## 2019-11-07 RX ADMIN — ENOXAPARIN SODIUM 40 MG: 40 INJECTION SUBCUTANEOUS at 10:05

## 2019-11-07 RX ADMIN — OCTREOTIDE ACETATE 50 MCG: 50 INJECTION, SOLUTION INTRAVENOUS; SUBCUTANEOUS at 14:02

## 2019-11-07 RX ADMIN — DEXTROSE MONOHYDRATE 150 ML/HR: 10 INJECTION, SOLUTION INTRAVENOUS at 10:05

## 2019-11-07 RX ADMIN — HYDROCORTISONE SODIUM SUCCINATE 50 MG: 100 INJECTION, POWDER, FOR SOLUTION INTRAMUSCULAR; INTRAVENOUS at 05:42

## 2019-11-07 NOTE — ROUTINE PROCESS
TRANSFER - IN REPORT: 
 
Verbal report received from [address] RN(name) on Lokesh Araujo  being received from [facility] ED(unit) for routine progression of care Report consisted of patients Situation, Background, Assessment and  
Recommendations(SBAR). Information from the following report(s) SBAR, Kardex, MAR, Accordion, Recent Results and Cardiac Rhythm SR was reviewed with the receiving nurse. Opportunity for questions and clarification was provided. Assessment completed upon patients arrival to unit and care assumed. 2225 BS 88 Dr Smooth Santana made aware and MD ordered to give Glucagon as ordered. MD wants to keep BS above 150. Will continue to monitor pt.  
0323 . 
0730 Bedside shift change report given to Tenzin Grande 794 (oncoming nurse) by Foster Phlegm (offgoing nurse). Report given with SBAR, Kardex, Intake/Output, MAR and Recent Results.

## 2019-11-07 NOTE — PROGRESS NOTES
0700: Assumed care of patient resting in bed quietly, NAD noted, girlfriend at bedside. Will continue to monitor patient. 0915: LORELEI Toribio NP at bedside to see patient. OK for patient to eat from Cardiology standpoint. 1039: Bedside echo complete. 0930: Dr Adele Kaye at bedside to see patient. 1144Apretty Zamora RN with Poison Control called to check on patient and recommended Octreotide  mcg SQ or IV q 6 hours and the goal blood glucose is >60. Also this needs to be reported to the Department of Health. Spoke to Dr Adele Kaye to inform him of this, called pharmacy to discuss dosing. 1900: Bedside and Verbal shift change report given to Grady Kelly RN (oncoming nurse) by Destiney Alvarez RN (offgoing nurse). Report included the following information SBAR, Kardex, Intake/Output, MAR, Recent Results and Cardiac Rhythm SB-SR c 1 AVB.

## 2019-11-07 NOTE — PROGRESS NOTES
Reason for Admission:  Hypoglycemia [E16.2]  Adverse effect of over-the-counter medication [T50.905A]                 RRAT Score:    6            Plan for utilizing home health:    no                      Likelihood of Readmission:   LOW                         Transition of Care Plan:              Initial assessment completed with patient. Cognitive status of patient: oriented to time, place, person and situation. Face sheet information confirmed:  yes. The patient designates Binta Fleming to participate in his discharge plan and to receive any needed information. This patient lives in a single family home with patient and mother. Patient is able to navigate steps as needed. Prior to hospitalization, patient was considered to be independent with ADLs/IADLS : yes . Patient has a current ACP document on file: no  The patient and friend will be available to transport patient home upon discharge. The patient already has none reported medical equipment available in the home. Patient is not currently active with home health. Patient has not stayed in a skilled nursing facility or rehab. This patient is on dialysis :no     Freedom of choice signed: no,. Currently, the discharge plan is Home. The patient states that he can obtain his medications from the pharmacy, and take his medications as directed.     Patient's current insurance is SELF PAY       Care Management Interventions  PCP Verified by CM: Yes(Needs PCP)  Mode of Transport at Discharge: Self  Current Support Network: [address] Follow Up Transport: Family  Plan discussed with Pt/Family/Caregiver: Yes  Discharge Location  Discharge Placement: [address], RN BSN  Care Manager  [phone]

## 2019-11-07 NOTE — CONSULTS
[facility] - Consult Note    Date of  Admission: 11/6/2019  3:52 PM     Primary Care Physician:  None     Plan:     1. Elevated troponin and CKMB - troponin 1 peak 0.36 now fawad trending-  Likely demand ischemia  in the setting persistent hypoglycemia. Will obtain echo to assess lvf, rvf or any wma- would recommended to d/c asa. No further cardiac w/u needed at this time  2. Persistent Hypoglycemia- in the setting of recent use of V8 (male enhancement OTC drug). BS improving  Patient  on D10% managed by medical team.  3. 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 No LV dysfunction on echo. Continue po hydration    XR Results (most recent):  Results from Hospital Encounter encounter on 04/17/16   XR CHEST PA LAT    Narrative EXAM:  XR CHEST PA LAT    INDICATION:   chest pain    COMPARISON: August 22, 2013. FINDINGS: PA and lateral radiographs of the chest demonstrate clear lungs. The  cardiac and mediastinal contours and pulmonary vascularity are normal.  The  bones and soft tissues are within normal limits. Exam is limited by the left lung apex being outside the field-of-view. Impression IMPRESSION:   1. Exam limited by left lung apex being outside of the field-of-view. No  evidence for acute infiltrates or pleural effusion.               Assessment:     Hospital Problems  Date Reviewed: 11/6/2019          Codes Class Noted POA    * (Principal) Hypoglycemia ICD-10-CM: E16.2  ICD-9-CM: 251.2  11/6/2019 Unknown        Adverse effect of over-the-counter medication ICD-10-CM: T50.905A  ICD-9-CM: E947.9  11/6/2019 Unknown        Elevated troponin ICD-10-CM: R79.89  ICD-9-CM: 790.6  11/6/2019 Unknown        Elevated CK ICD-10-CM: R74.8  ICD-9-CM: 790.5  11/6/2019 Unknown        Elevated CK-MB level ICD-10-CM: R74.8  ICD-9-CM: 790.5  11/6/2019 Unknown        Seizure-like activity ([facility] [address] ICD-10-CM: R56.9  ICD-9-CM: 780.39  11/6/2019 Unknown History of Present Illness: This is a 29 y.o. male admitted for Hypoglycemia [E16. 2]Adverse effect of over-the-counter medication [T50.905A]. Patient with  PMH except for tobacco use until 3 weeks ago and marijuana use 1 cigarette daily. Patient presented to ER with altered mental status, stiffness heavy sweating and was found to have a fingerstick of 45. Patient received dextrose injection and was brought to ER . he continued to be hypoglycemic and was placed on D10 if continued to be around 100 FS Patient states that he took some male enhancement OTC called V8 2 days ago and since that time he used to wake up in the middle the night heavily sweating with palpitations and needs to eat to feel better In ER patient was found also to have mildly elevated troponin of 0.11 and trended up to 0.26 and has nonspecific changes on EKG but denies chest pain shortness of breath at this time. Patient also denies drug abuse except for marijuana and does not have any other complaints. Past Medical History:   History reviewed. No pertinent past medical history. Social History:     Social History     Socioeconomic History    Marital status: SINGLE     Spouse name: Not on file    Number of children: Not on file    Years of education: Not on file    Highest education level: Not on file   Tobacco Use    Smoking status: Former Smoker     Packs/day: 0.25     Last attempt to quit: 10/1/2019     Years since quittin.1    Smokeless tobacco: Never Used   Substance and Sexual Activity    Alcohol use: Yes    Drug use: No        Family History:   History reviewed. No pertinent family history.      Medications:   No Known Allergies     Current Facility-Administered Medications   Medication Dose Route Frequency    hydrocortisone Sod Succ (PF) (SOLU-CORTEF) injection 50 mg  50 mg IntraVENous Q8H    dextrose 10% infusion  150 mL/hr IntraVENous CONTINUOUS    glucose chewable tablet 16 g  4 Tab Oral PRN  glucagon (GLUCAGEN) injection 1 mg  1 mg IntraMUSCular PRN    dextrose 10% infusion 125-250 mL  125-250 mL IntraVENous PRN    acetaminophen (TYLENOL) tablet 650 mg  650 mg Oral Q6H PRN    naloxone (NARCAN) injection 0.4 mg  0.4 mg IntraVENous PRN    ondansetron (ZOFRAN) injection 4 mg  4 mg IntraVENous Q6H PRN    docusate sodium (COLACE) capsule 100 mg  100 mg Oral BID PRN    enoxaparin (LOVENOX) injection 40 mg  40 mg SubCUTAneous Q24H    aspirin chewable tablet 81 mg  81 mg Oral DAILY        Review Of Systems:         Constitutional: night sweats   HEENT: No epistaxis, no nasal drainage, no difficulty in swallowing, no redness in eyes  Respiratory: negative for cough, sputum, hemoptysis, pleurisy/chest pain, wheezing, dyspnea on exertion or emphysema  Cardiovascular: no chest pain, no chest pressure, no dyspnea, no pnd, no claudication no palpitations, no chronic leg edema, no syncope  Gastrointestinal: no abd pain, no vomiting, no diarrhea, no bleeding symptoms  Genitourinary: No urinary symptoms or hematuria  Integument/breast: No ulcers or rashes  Musculoskeletal: no muscle pain, no weakness  Neurological: No focal weakness, no seizures, no headaches  Behvioral/Psych: No anxiety, no depression             Physical Exam:     Visit Vitals  /83   Pulse 62   Temp 98.1 °F (36.7 °C)   Resp 15   Ht 6' (1.829 m)   Wt 79.4 kg (175 lb)   SpO2 99%   BMI 23.73 kg/m²     BP Readings from Last 3 Encounters:   11/07/19 126/83   04/17/16 122/78   12/17/13 144/87     Pulse Readings from Last 3 Encounters:   11/07/19 62   04/17/16 75   12/17/13 92     Wt Readings from Last 3 Encounters:   11/06/19 79.4 kg (175 lb)   04/17/16 68 kg (150 lb)   12/17/13 68 kg (150 lb)       General:  alert, cooperative, no distress, appears stated age  Skin: Warm and dry, acyanotic, normal color. Head: Normocephalic, atraumatic. Eyes: Sclerae anicteric, conjunctivae without injection.   Neck:  nontender, no nuchal rigidity, no masses, no stridor, no carotid bruit, no JVD  Lungs:  clear to auscultation bilaterally, rhonchi. Heart:  regular rate and rhythm, S1, S2 normal, no murmur, click, rub or gallop  Abdomen:  abdomen is soft without significant tenderness, masses, organomegaly or guarding  Extremities:  extremities normal, atraumatic, no cyanosis or edema. Peripheral pulses present  Neurological: grossly intact. No focal abnormalities, moves all extremities well. Psychiatric Affect: The patient is awake, alert and oriented x3. Judit Uri is interactive and appropriate. Data Review:     Recent Results (from the past 48 hour(s))   GLUCOSE, POC    Collection Time: 11/06/19  3:53 PM   Result Value Ref Range    Glucose (POC) 86 70 - 110 mg/dL   GLUCOSE, POC    Collection Time: 11/06/19  4:16 PM   Result Value Ref Range    Glucose (POC) 73 70 - 110 mg/dL   EKG, 12 LEAD, INITIAL    Collection Time: 11/06/19  4:27 PM   Result Value Ref Range    Ventricular Rate 53 BPM    Atrial Rate 53 BPM    P-R Interval 184 ms    QRS Duration 100 ms    Q-T Interval 400 ms    QTC Calculation (Bezet) 375 ms    Calculated P Axis 97 degrees    Calculated R Axis 92 degrees    Calculated T Axis 72 degrees    Diagnosis       Suspect arm lead reversal, interpretation assumes no reversal  Sinus bradycardia  Rightward axis  Incomplete right bundle branch block  Borderline ECG  When compared with ECG of 22-AUG-2013 15:23,  RI interval has decreased     CBC WITH AUTOMATED DIFF    Collection Time: 11/06/19  4:30 PM   Result Value Ref Range    WBC 8.7 4.6 - 13.2 K/uL    RBC 5.18 4.70 - 5.50 M/uL    HGB 15.1 13.0 - 16.0 g/dL    HCT 43.9 36.0 - 48.0 %    MCV 84.7 74.0 - 97.0 FL    MCH 29.2 24.0 - 34.0 PG    MCHC 34.4 31.0 - 37.0 g/dL    RDW 14.2 11.6 - 14.5 %    PLATELET 497 387 - 159 K/uL    MPV 9.8 9.2 - 11.8 FL    NEUTROPHILS 83 (H) 40 - 73 %    LYMPHOCYTES 10 (L) 21 - 52 %    MONOCYTES 7 3 - 10 %    EOSINOPHILS 0 0 - 5 %    BASOPHILS 0 0 - 2 %    ABS.  NEUTROPHILS 7. 3 1.8 - 8.0 K/UL    ABS. LYMPHOCYTES 0.8 (L) 0.9 - 3.6 K/UL    ABS. MONOCYTES 0.6 0.05 - 1.2 K/UL    ABS. EOSINOPHILS 0.0 0.0 - 0.4 K/UL    ABS. BASOPHILS 0.0 0.0 - 0.1 K/UL    DF AUTOMATED     METABOLIC PANEL, COMPREHENSIVE    Collection Time: 11/06/19  4:30 PM   Result Value Ref Range    Sodium 140 136 - 145 mmol/L    Potassium 4.0 3.5 - 5.5 mmol/L    Chloride 106 100 - 111 mmol/L    CO2 31 21 - 32 mmol/L    Anion gap 3 3.0 - 18 mmol/L    Glucose 47 (LL) 74 - 99 mg/dL    BUN 7 7.0 - 18 MG/DL    Creatinine 1.01 0.6 - 1.3 MG/DL    BUN/Creatinine ratio 7 (L) 12 - 20      GFR est AA >60 >60 ml/min/1.73m2    GFR est non-AA >60 >60 ml/min/1.73m2    Calcium 9.4 8.5 - 10.1 MG/DL    Bilirubin, total 0.5 0.2 - 1.0 MG/DL    ALT (SGPT) 25 16 - 61 U/L    AST (SGOT) 20 10 - 38 U/L    Alk.  phosphatase 55 45 - 117 U/L    Protein, total 7.3 6.4 - 8.2 g/dL    Albumin 3.8 3.4 - 5.0 g/dL    Globulin 3.5 2.0 - 4.0 g/dL    A-G Ratio 1.1 0.8 - 1.7     ETHYL ALCOHOL    Collection Time: 11/06/19  4:30 PM   Result Value Ref Range    ALCOHOL(ETHYL),SERUM <3 0 - 3 MG/DL   CARDIAC PANEL,(CK, CKMB & TROPONIN)    Collection Time: 11/06/19  4:30 PM   Result Value Ref Range     (H) 39 - 308 U/L    CK - MB 3.6 (H) <3.6 ng/ml    CK-MB Index 1.0 0.0 - 4.0 %    Troponin-I, QT 0.11 (H) 0.0 - 0.045 NG/ML   GLUCOSE, POC    Collection Time: 11/06/19  5:06 PM   Result Value Ref Range    Glucose (POC) 102 70 - 110 mg/dL   GLUCOSE, POC    Collection Time: 11/06/19  5:50 PM   Result Value Ref Range    Glucose (POC) 85 70 - 110 mg/dL   GLUCOSE, POC    Collection Time: 11/06/19  6:05 PM   Result Value Ref Range    Glucose (POC) 89 70 - 110 mg/dL   EKG, 12 LEAD, SUBSEQUENT    Collection Time: 11/06/19  6:05 PM   Result Value Ref Range    Ventricular Rate 65 BPM    Atrial Rate 65 BPM    P-R Interval 240 ms    QRS Duration 88 ms    Q-T Interval 368 ms    QTC Calculation (Bezet) 382 ms    Calculated P Axis 57 degrees    Calculated R Axis 96 degrees Calculated T Axis 72 degrees    Diagnosis       Sinus rhythm with sinus arrhythmia with 1st degree AV block  Rightward axis  RSR' or QR pattern in V1 suggests right ventricular conduction delay  ST elevation, consider early repolarization, pericarditis, or injury  Abnormal ECG  When compared with ECG of 06-NOV-2019 16:27,  DC interval has increased     CARDIAC PANEL,(CK, CKMB & TROPONIN)    Collection Time: 11/06/19  6:10 PM   Result Value Ref Range     (H) 39 - 308 U/L    CK - MB 4.7 (H) <3.6 ng/ml    CK-MB Index 0.8 0.0 - 4.0 %    Troponin-I, QT 0.26 (H) 0.0 - 0.045 NG/ML   GLUCOSE, POC    Collection Time: 11/06/19  6:33 PM   Result Value Ref Range    Glucose (POC) 78 70 - 110 mg/dL   GLUCOSE, POC    Collection Time: 11/06/19  7:20 PM   Result Value Ref Range    Glucose (POC) 147 (H) 70 - 110 mg/dL   URINALYSIS W/ RFLX MICROSCOPIC    Collection Time: 11/06/19  7:45 PM   Result Value Ref Range    Color YELLOW      Appearance CLEAR      Specific gravity 1.007 1.005 - 1.030      pH (UA) 6.0 5.0 - 8.0      Protein 100 (A) NEG mg/dL    Glucose 500 (A) NEG mg/dL    Ketone NEGATIVE  NEG mg/dL    Bilirubin NEGATIVE  NEG      Blood NEGATIVE  NEG      Urobilinogen 0.2 0.2 - 1.0 EU/dL    Nitrites NEGATIVE  NEG      Leukocyte Esterase NEGATIVE  NEG     DRUG SCREEN, URINE    Collection Time: 11/06/19  7:45 PM   Result Value Ref Range    BENZODIAZEPINES NEGATIVE  NEG      BARBITURATES NEGATIVE  NEG      THC (TH-CANNABINOL) POSITIVE (A) NEG      OPIATES NEGATIVE  NEG      PCP(PHENCYCLIDINE) NEGATIVE  NEG      COCAINE NEGATIVE  NEG      AMPHETAMINES NEGATIVE  NEG      METHADONE NEGATIVE  NEG      HDSCOM (NOTE)    URINE MICROSCOPIC ONLY    Collection Time: 11/06/19  7:45 PM   Result Value Ref Range    WBC NONE 0 - 4 /hpf    RBC NONE 0 - 5 /hpf    Epithelial cells NEGATIVE  0 - 5 /lpf    Bacteria NEGATIVE  NEG /hpf   GLUCOSE, POC    Collection Time: 11/06/19  8:28 PM   Result Value Ref Range    Glucose (POC) 108 70 - 110 mg/dL   GLUCOSE, POC    Collection Time: 11/06/19  9:30 PM   Result Value Ref Range    Glucose (POC) 107 70 - 110 mg/dL   CARDIAC PANEL,(CK, CKMB & TROPONIN)    Collection Time: 11/06/19  9:33 PM   Result Value Ref Range    CK 1,178 (H) 39 - 308 U/L    CK - MB 9.1 (H) <3.6 ng/ml    CK-MB Index 0.8 0.0 - 4.0 %    Troponin-I, QT 0.28 (H) 0.0 - 0.045 NG/ML   GLUCOSE, POC    Collection Time: 11/06/19 10:16 PM   Result Value Ref Range    Glucose (POC) 88 70 - 110 mg/dL   GLUCOSE, POC    Collection Time: 11/06/19 11:50 PM   Result Value Ref Range    Glucose (POC) 107 70 - 110 mg/dL   GLUCOSE, POC    Collection Time: 11/07/19 12:59 AM   Result Value Ref Range    Glucose (POC) 102 70 - 110 mg/dL   GLUCOSE, POC    Collection Time: 11/07/19  2:04 AM   Result Value Ref Range    Glucose (POC) 154 (H) 70 - 280 mg/dL   METABOLIC PANEL, BASIC    Collection Time: 11/07/19  3:26 AM   Result Value Ref Range    Sodium 138 136 - 145 mmol/L    Potassium 4.4 3.5 - 5.5 mmol/L    Chloride 107 100 - 111 mmol/L    CO2 27 21 - 32 mmol/L    Anion gap 4 3.0 - 18 mmol/L    Glucose 114 (H) 74 - 99 mg/dL    BUN 7 7.0 - 18 MG/DL    Creatinine 1.08 0.6 - 1.3 MG/DL    BUN/Creatinine ratio 6 (L) 12 - 20      GFR est AA >60 >60 ml/min/1.73m2    GFR est non-AA >60 >60 ml/min/1.73m2    Calcium 9.3 8.5 - 10.1 MG/DL   MAGNESIUM    Collection Time: 11/07/19  3:26 AM   Result Value Ref Range    Magnesium 2.1 1.6 - 2.6 mg/dL   PHOSPHORUS    Collection Time: 11/07/19  3:26 AM   Result Value Ref Range    Phosphorus 1.5 (L) 2.5 - 4.9 MG/DL   CBC WITH AUTOMATED DIFF    Collection Time: 11/07/19  3:26 AM   Result Value Ref Range    WBC 12.8 4.6 - 13.2 K/uL    RBC 5.10 4.70 - 5.50 M/uL    HGB 15.2 13.0 - 16.0 g/dL    HCT 43.0 36.0 - 48.0 %    MCV 84.3 74.0 - 97.0 FL    MCH 29.8 24.0 - 34.0 PG    MCHC 35.3 31.0 - 37.0 g/dL    RDW 14.5 11.6 - 14.5 %    PLATELET 762 764 - 299 K/uL    MPV 10.8 9.2 - 11.8 FL    NEUTROPHILS 87 (H) 40 - 73 %    LYMPHOCYTES 10 (L) 21 - 52 %    MONOCYTES 3 3 - 10 %    EOSINOPHILS 0 0 - 5 %    BASOPHILS 0 0 - 2 %    ABS. NEUTROPHILS 11.2 (H) 1.8 - 8.0 K/UL    ABS. LYMPHOCYTES 1.3 0.9 - 3.6 K/UL    ABS. MONOCYTES 0.4 0.05 - 1.2 K/UL    ABS. EOSINOPHILS 0.0 0.0 - 0.4 K/UL    ABS.  BASOPHILS 0.0 0.0 - 0.1 K/UL    DF AUTOMATED     LIPID PANEL    Collection Time: 11/07/19  3:26 AM   Result Value Ref Range    LIPID PROFILE          Cholesterol, total 146 <200 MG/DL    Triglyceride 53 <150 MG/DL    HDL Cholesterol 79 (H) 40 - 60 MG/DL    LDL, calculated 56.4 0 - 100 MG/DL    VLDL, calculated 10.6 MG/DL    CHOL/HDL Ratio 1.8 0 - 5.0     CARDIAC PANEL,(CK, CKMB & TROPONIN)    Collection Time: 11/07/19  3:26 AM   Result Value Ref Range    CK 1,649 (H) 39 - 308 U/L    CK - MB 12.8 (H) <3.6 ng/ml    CK-MB Index 0.8 0.0 - 4.0 %    Troponin-I, QT 0.10 (H) 0.0 - 0.045 NG/ML   GLUCOSE, POC    Collection Time: 11/07/19  3:36 AM   Result Value Ref Range    Glucose (POC) 119 (H) 70 - 110 mg/dL   GLUCOSE, POC    Collection Time: 11/07/19  4:21 AM   Result Value Ref Range    Glucose (POC) 121 (H) 70 - 110 mg/dL   GLUCOSE, POC    Collection Time: 11/07/19  5:28 AM   Result Value Ref Range    Glucose (POC) 137 (H) 70 - 110 mg/dL   GLUCOSE, POC    Collection Time: 11/07/19  6:10 AM   Result Value Ref Range    Glucose (POC) 161 (H) 70 - 110 mg/dL   GLUCOSE, POC    Collection Time: 11/07/19  7:12 AM   Result Value Ref Range    Glucose (POC) 133 (H) 70 - 110 mg/dL   GLUCOSE, POC    Collection Time: 11/07/19  8:07 AM   Result Value Ref Range    Glucose (POC) 128 (H) 70 - 110 mg/dL         Intake/Output Summary (Last 24 hours) at 11/7/2019 0829  Last data filed at 11/7/2019 0617  Gross per 24 hour   Intake 3353.34 ml   Output 2200 ml   Net 1153.34 ml       Cardiographics:       EKG Results     Procedure 720 Value Units Date/Time    EKG, 12 LEAD, SUBSEQUENT [[id number]] Collected:  11/06/19 1805    Order Status:  Completed Updated:  11/07/19 0609     Ventricular Rate 65 BPM Atrial Rate 65 BPM      P-R Interval 240 ms      QRS Duration 88 ms      Q-T Interval 368 ms      QTC Calculation (Bezet) 382 ms      Calculated P Axis 57 degrees      Calculated R Axis 96 degrees      Calculated T Axis 72 degrees      Diagnosis --     Sinus rhythm with sinus arrhythmia with 1st degree AV block  Rightward axis  RSR' or QR pattern in V1 suggests right ventricular conduction delay  ST elevation, consider early repolarization, pericarditis, or injury  Abnormal ECG  When compared with ECG of 06-NOV-2019 16:27,  OH interval has increased      EKG, 12 LEAD, INITIAL [[id number]] Collected:  11/06/19 1627    Order Status:  Completed Updated:  11/07/19 0609     Ventricular Rate 53 BPM      Atrial Rate 53 BPM      P-R Interval 184 ms      QRS Duration 100 ms      Q-T Interval 400 ms      QTC Calculation (Bezet) 375 ms      Calculated P Axis 97 degrees      Calculated R Axis 92 degrees      Calculated T Axis 72 degrees      Diagnosis --     Suspect arm lead reversal, interpretation assumes no reversal  Sinus bradycardia  Rightward axis  Incomplete right bundle branch block  Borderline ECG  When compared with ECG of 22-AUG-2013 15:23,  OH interval has decreased                 Signed By: Griselda Ginsberg NP-C Phone [phone] supervised    November 7, 2019      I have independently evaluated and examined the patient. All relevant labs and testing data's are reviewed. Care plan discussed and updated after review.     Joelle Rose MD

## 2019-11-07 NOTE — H&P
History & Physical    Patient: Nemo Briseno MRN: [id number]  [facility]: [id number]    YOB: 1985  Age: 29 y.o. Sex: male      DOA: 11/6/2019    Chief Complaint:   Chief Complaint   Patient presents with    Low Blood Sugar          HPI:     Nemo Briseno is a 29 y.o.  male who has no PMH except for tobacco use until 3 weeks ago and marijuana use 1 cigarette daily  Patient presented to ER with altered mental status, stiffness heavy sweating and was found to have a fingerstick of 45  As per patient's girlfriend, she last heard from patient around 12 noon. she tried to contact him afterwards but she received no response. she got worried and went to the house to find him on the floor unresponsive, staring and aphasic.patient was also shaking badly arms were stiff and fingers were clenching. Patient received dextrose injection and was brought to ER  In ER patient was more alert and oriented and almost back to baseline. he continued to be hypoglycemic and was placed on D10 if continued to be around 100 FS  Patient states that he took some male enhancement OTC called V8 2 days ago and since that time he used to wake up in the middle the night heavily sweating with palpitations and needs to eat to feel better  Patient cannot really recall what happened earlier today telemetry he was found by his girlfriend and EMS and brought to ER  In ER patient was found also to have mildly elevated troponin of 0.11 and trended up to 0.26 and has nonspecific changes on EKG but denies chest pain shortness of breath at this time  Patient also denies drug abuse except for marijuana and does not have any other complaints except for his left arm swelling secondary to infiltrated IV earlier        History reviewed. No pertinent past medical history. History reviewed. No pertinent surgical history. History reviewed. No pertinent family history.     Social History     Socioeconomic History    Marital status: SINGLE     Spouse name: Not on file    Number of children: Not on file    Years of education: Not on file    Highest education level: Not on file   Tobacco Use    Smoking status: Former Smoker     Packs/day: 0.25     Last attempt to quit: 10/1/2019     Years since quittin.0    Smokeless tobacco: Never Used   Substance and Sexual Activity    Alcohol use: Yes    Drug use: No       Prior to Admission medications    Not on File       No Known Allergies      Review of Systems  GENERAL: Patient alert, awake and oriented times 3, able to communicate full sentences and not in distress. HEENT: No change in vision, no earache, tinnitus, sore throat or sinus congestion. NECK: No pain or stiffness. PULMONARY: No shortness of breath, cough or wheeze. Cardiovascular: no pnd / orthopnea, no CP  GASTROINTESTINAL: No abdominal pain, nausea, vomiting or diarrhea, melena or bright red blood per rectum. GENITOURINARY: No urinary frequency, urgency, hesitancy or dysuria. MUSCULOSKELETAL: No joint or muscle pain, no back pain, no recent trauma. DERMATOLOGIC: No rash, no itching, no lesions. ENDOCRINE: No polyuria, polydipsia, no heat or cold intolerance. No recent change in weight. HEMATOLOGICAL: No anemia or easy bruising or bleeding. NEUROLOGIC: No headache, seizures, numbness, tingling or weakness. Physical Exam:     Physical Exam:  Visit Vitals  /82   Pulse 67   Temp 97.9 °F (36.6 °C)   Resp 16   Ht 6' (1.829 m)   Wt 79.4 kg (175 lb)   SpO2 100%   BMI 23.73 kg/m²      O2 Device: Room air    Temp (24hrs), Av.6 °F (36.4 °C), Min:97.3 °F (36.3 °C), Max:97.9 °F (36.6 °C)     1901 -  0700  In: 240 [P.O.:240]  Out: 500 [Urine:500]   No intake/output data recorded. General:  Alert, cooperative, no distress, appears stated age. Head: Normocephalic, without obvious abnormality, atraumatic. Eyes:  Conjunctivae/corneas clear. PERRL, EOMs intact.    Nose: Nares normal. No drainage or sinus tenderness. Neck: Supple, symmetrical, trachea midline, no adenopathy, thyroid: no enlargement, no carotid bruit and no JVD. Lungs:   Clear to auscultation bilaterally. Heart:  Regular rate and rhythm, S1, S2 normal.     Abdomen: Soft, non-tender. Bowel sounds normal.    Extremities: Extremities normal, atraumatic, no cyanosis or edema. Pulses: 2+ and symmetric all extremities. Skin:  No rashes or lesions   Neurologic: AAOx3, No focal motor or sensory deficit. Labs Reviewed:    Lab results reviewed. For significant abnormal values and values requiring intervention, see assessment and plan. CXR and EKG    Procedures/imaging: see electronic medical records for all procedures/Xrays and details which were not copied into this note but were reviewed prior to creation of Plan      Assessment/Plan     Principal Problem:    Hypoglycemia (11/6/2019)    Active Problems:    Adverse effect of over-the-counter medication (11/6/2019)      Elevated troponin (11/6/2019)      Elevated CK (11/6/2019)      Elevated CK-MB level (11/6/2019)       Patient is being admitted to stepdown unit for persistent hypoglycemia secondary to a male enhancement agent OTC  Seizure-like activity secondary to above  Elevated troponin and CKMB mostly secondary to hypoglycemia and demand ischemia  Elevated CK level    We will continue D10 infusion at 200 mL/h  Continue regular diet until 3 AM then n.p.o. for possible intervention if any  Glucagon 1 mg IM as needed if fingerstick less than 100  Series of cardiac enzymes  Patient received 1 dose of therapeutic Lovenox 1 mg/kg  Cardiology consult is called  Aspirin  Echo in a.m.   We will give 1 dose of hydrocortisone 100 mg while patient is hypoglycemic  We will continue to monitor fingerstick closely overnight    If patient does not respond to above measures, he will need endocrinology consult and further measures to figure out etiology of hypoglycemia    DVT/GI Prophylaxis: Lovenox    Plan of care is discussed in details with Patient/Family at bedside and agreed upon    Mimi Reece MD  11/6/2019 9:27 PM

## 2019-11-07 NOTE — PROGRESS NOTES
[facility] Hospitalist Group  Progress Note    Patient: Michael Laurent Age: 29 y.o. : 1985 MR#: [id number] SSN: [id number]  Date/Time: 2019     Subjective:     Pt seen & evaluated - improved this AM       Assessment/Plan:     1. 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 - currently on D10W @100ml/hr - poison control contacted - advised octreotide SQ   DVT px - Lovenox   FC           Case discussed with:  [x]Patient  []Family  [x]Nursing  []Case Management  DVT Prophylaxis:  [x]Lovenox  []Hep SQ  []SCDs  []Coumadin   []On Heparin gtt    Objective:   VS:   Visit Vitals  /78 (BP 1 Location: Left arm, BP Patient Position: At rest)   Pulse (!) 53   Temp 97.6 °F (36.4 °C)   Resp 14   Ht 6' (1.829 m)   Wt 79.4 kg (175 lb)   SpO2 99%   BMI 23.73 kg/m²      Tmax/24hrs: Temp (24hrs), Av.8 °F (36.6 °C), Min:97.3 °F (36.3 °C), Max:98.3 °F (36.8 °C)  IOBRIEF    Intake/Output Summary (Last 24 hours) at 2019 1522  Last data filed at 2019 1400  Gross per 24 hour   Intake 4269.18 ml   Output 3150 ml   Net 1119.18 ml       General:  Alert, cooperative, no acute distress    HEENT: PERRLA, anicteric sclerae. Pulmonary:  CTA Bilaterally. No Wheezing/Rhonchi/Rales. Cardiovascular: Regular rate and Rhythm. GI:  Soft, Non distended, Non tender. + Bowel sounds. Extremities:  No edema, cyanosis, clubbing. No calf tenderness. Neurologic: Alert and oriented X 3. No acute neuro deficits.   Additional:    Medications:   Current Facility-Administered Medications   Medication Dose Route Frequency    octreotide (SANDOSTATIN) injection 50 mcg  50 mcg SubCUTAneous Q6H    dextrose 10% infusion  150 mL/hr IntraVENous CONTINUOUS    glucose chewable tablet 16 g  4 Tab Oral PRN    glucagon (GLUCAGEN) injection 1 mg  1 mg IntraMUSCular PRN    dextrose 10% infusion 125-250 mL  125-250 mL IntraVENous PRN    acetaminophen (TYLENOL) tablet 650 mg  650 mg Oral Q6H PRN    naloxone [facility]) injection 0.4 mg  0.4 mg IntraVENous PRN    ondansetron (ZOFRAN) injection 4 mg  4 mg IntraVENous Q6H PRN    docusate sodium (COLACE) capsule 100 mg  100 mg Oral BID PRN    enoxaparin (LOVENOX) injection 40 mg  40 mg SubCUTAneous Q24H       Labs:    Recent Results (from the past 48 hour(s))   GLUCOSE, POC    Collection Time: 11/06/19  3:53 PM   Result Value Ref Range    Glucose (POC) 86 70 - 110 mg/dL   GLUCOSE, POC    Collection Time: 11/06/19  4:16 PM   Result Value Ref Range    Glucose (POC) 73 70 - 110 mg/dL   EKG, 12 LEAD, INITIAL    Collection Time: 11/06/19  4:27 PM   Result Value Ref Range    Ventricular Rate 53 BPM    Atrial Rate 53 BPM    P-R Interval 184 ms    QRS Duration 100 ms    Q-T Interval 400 ms    QTC Calculation (Bezet) 375 ms    Calculated P Axis 97 degrees    Calculated R Axis 92 degrees    Calculated T Axis 72 degrees    Diagnosis       Suspect arm lead reversal, interpretation assumes no reversal  Sinus bradycardia  Rightward axis  Incomplete right bundle branch block  Borderline ECG  When compared with ECG of 22-AUG-2013 15:23,  VA interval has decreased     CBC WITH AUTOMATED DIFF    Collection Time: 11/06/19  4:30 PM   Result Value Ref Range    WBC 8.7 4.6 - 13.2 K/uL    RBC 5.18 4.70 - 5.50 M/uL    HGB 15.1 13.0 - 16.0 g/dL    HCT 43.9 36.0 - 48.0 %    MCV 84.7 74.0 - 97.0 FL    MCH 29.2 24.0 - 34.0 PG    MCHC 34.4 31.0 - 37.0 g/dL    RDW 14.2 11.6 - 14.5 %    PLATELET 442 862 - 698 K/uL    MPV 9.8 9.2 - 11.8 FL    NEUTROPHILS 83 (H) 40 - 73 %    LYMPHOCYTES 10 (L) 21 - 52 %    MONOCYTES 7 3 - 10 %    EOSINOPHILS 0 0 - 5 %    BASOPHILS 0 0 - 2 %    ABS. NEUTROPHILS 7.3 1.8 - 8.0 K/UL    ABS. LYMPHOCYTES 0.8 (L) 0.9 - 3.6 K/UL    ABS. MONOCYTES 0.6 0.05 - 1.2 K/UL    ABS. EOSINOPHILS 0.0 0.0 - 0.4 K/UL    ABS.  BASOPHILS 0.0 0.0 - 0.1 K/UL    DF AUTOMATED     METABOLIC PANEL, COMPREHENSIVE    Collection Time: 11/06/19  4:30 PM   Result Value Ref Range    Sodium 140 136 - 145 mmol/L    Potassium 4.0 3.5 - 5.5 mmol/L    Chloride 106 100 - 111 mmol/L    CO2 31 21 - 32 mmol/L    Anion gap 3 3.0 - 18 mmol/L    Glucose 47 (LL) 74 - 99 mg/dL    BUN 7 7.0 - 18 MG/DL    Creatinine 1.01 0.6 - 1.3 MG/DL    BUN/Creatinine ratio 7 (L) 12 - 20      GFR est AA >60 >60 ml/min/1.73m2    GFR est non-AA >60 >60 ml/min/1.73m2    Calcium 9.4 8.5 - 10.1 MG/DL    Bilirubin, total 0.5 0.2 - 1.0 MG/DL    ALT (SGPT) 25 16 - 61 U/L    AST (SGOT) 20 10 - 38 U/L    Alk.  phosphatase 55 45 - 117 U/L    Protein, total 7.3 6.4 - 8.2 g/dL    Albumin 3.8 3.4 - 5.0 g/dL    Globulin 3.5 2.0 - 4.0 g/dL    A-G Ratio 1.1 0.8 - 1.7     ETHYL ALCOHOL    Collection Time: 11/06/19  4:30 PM   Result Value Ref Range    ALCOHOL(ETHYL),SERUM <3 0 - 3 MG/DL   CARDIAC PANEL,(CK, CKMB & TROPONIN)    Collection Time: 11/06/19  4:30 PM   Result Value Ref Range     (H) 39 - 308 U/L    CK - MB 3.6 (H) <3.6 ng/ml    CK-MB Index 1.0 0.0 - 4.0 %    Troponin-I, QT 0.11 (H) 0.0 - 0.045 NG/ML   GLUCOSE, POC    Collection Time: 11/06/19  5:06 PM   Result Value Ref Range    Glucose (POC) 102 70 - 110 mg/dL   GLUCOSE, POC    Collection Time: 11/06/19  5:50 PM   Result Value Ref Range    Glucose (POC) 85 70 - 110 mg/dL   GLUCOSE, POC    Collection Time: 11/06/19  6:05 PM   Result Value Ref Range    Glucose (POC) 89 70 - 110 mg/dL   EKG, 12 LEAD, SUBSEQUENT    Collection Time: 11/06/19  6:05 PM   Result Value Ref Range    Ventricular Rate 65 BPM    Atrial Rate 65 BPM    P-R Interval 240 ms    QRS Duration 88 ms    Q-T Interval 368 ms    QTC Calculation (Bezet) 382 ms    Calculated P Axis 57 degrees    Calculated R Axis 96 degrees    Calculated T Axis 72 degrees    Diagnosis       Sinus rhythm with sinus arrhythmia with 1st degree AV block  Rightward axis  RSR' or QR pattern in V1 suggests right ventricular conduction delay  ST elevation, consider early repolarization, pericarditis, or injury  Abnormal ECG  When compared with ECG of 06-NOV-2019 16:27,  NC interval has increased     CARDIAC PANEL,(CK, CKMB & TROPONIN)    Collection Time: 11/06/19  6:10 PM   Result Value Ref Range     (H) 39 - 308 U/L    CK - MB 4.7 (H) <3.6 ng/ml    CK-MB Index 0.8 0.0 - 4.0 %    Troponin-I, QT 0.26 (H) 0.0 - 0.045 NG/ML   GLUCOSE, POC    Collection Time: 11/06/19  6:33 PM   Result Value Ref Range    Glucose (POC) 78 70 - 110 mg/dL   GLUCOSE, POC    Collection Time: 11/06/19  7:20 PM   Result Value Ref Range    Glucose (POC) 147 (H) 70 - 110 mg/dL   URINALYSIS W/ RFLX MICROSCOPIC    Collection Time: 11/06/19  7:45 PM   Result Value Ref Range    Color YELLOW      Appearance CLEAR      Specific gravity 1.007 1.005 - 1.030      pH (UA) 6.0 5.0 - 8.0      Protein 100 (A) NEG mg/dL    Glucose 500 (A) NEG mg/dL    Ketone NEGATIVE  NEG mg/dL    Bilirubin NEGATIVE  NEG      Blood NEGATIVE  NEG      Urobilinogen 0.2 0.2 - 1.0 EU/dL    Nitrites NEGATIVE  NEG      Leukocyte Esterase NEGATIVE  NEG     DRUG SCREEN, URINE    Collection Time: 11/06/19  7:45 PM   Result Value Ref Range    BENZODIAZEPINES NEGATIVE  NEG      BARBITURATES NEGATIVE  NEG      THC (TH-CANNABINOL) POSITIVE (A) NEG      OPIATES NEGATIVE  NEG      PCP(PHENCYCLIDINE) NEGATIVE  NEG      COCAINE NEGATIVE  NEG      AMPHETAMINES NEGATIVE  NEG      METHADONE NEGATIVE  NEG      HDSCOM (NOTE)    URINE MICROSCOPIC ONLY    Collection Time: 11/06/19  7:45 PM   Result Value Ref Range    WBC NONE 0 - 4 /hpf    RBC NONE 0 - 5 /hpf    Epithelial cells NEGATIVE  0 - 5 /lpf    Bacteria NEGATIVE  NEG /hpf   GLUCOSE, POC    Collection Time: 11/06/19  8:28 PM   Result Value Ref Range    Glucose (POC) 108 70 - 110 mg/dL   GLUCOSE, POC    Collection Time: 11/06/19  9:30 PM   Result Value Ref Range    Glucose (POC) 107 70 - 110 mg/dL   CARDIAC PANEL,(CK, CKMB & TROPONIN)    Collection Time: 11/06/19  9:33 PM   Result Value Ref Range    CK 1,178 (H) 39 - 308 U/L    CK - MB 9.1 (H) <3.6 ng/ml    CK-MB Index 0.8 0.0 - 4.0 % Troponin-I, QT 0.28 (H) 0.0 - 0.045 NG/ML   GLUCOSE, POC    Collection Time: 11/06/19 10:16 PM   Result Value Ref Range    Glucose (POC) 88 70 - 110 mg/dL   GLUCOSE, POC    Collection Time: 11/06/19 11:50 PM   Result Value Ref Range    Glucose (POC) 107 70 - 110 mg/dL   GLUCOSE, POC    Collection Time: 11/07/19 12:59 AM   Result Value Ref Range    Glucose (POC) 102 70 - 110 mg/dL   GLUCOSE, POC    Collection Time: 11/07/19  2:04 AM   Result Value Ref Range    Glucose (POC) 154 (H) 70 - 888 mg/dL   METABOLIC PANEL, BASIC    Collection Time: 11/07/19  3:26 AM   Result Value Ref Range    Sodium 138 136 - 145 mmol/L    Potassium 4.4 3.5 - 5.5 mmol/L    Chloride 107 100 - 111 mmol/L    CO2 27 21 - 32 mmol/L    Anion gap 4 3.0 - 18 mmol/L    Glucose 114 (H) 74 - 99 mg/dL    BUN 7 7.0 - 18 MG/DL    Creatinine 1.08 0.6 - 1.3 MG/DL    BUN/Creatinine ratio 6 (L) 12 - 20      GFR est AA >60 >60 ml/min/1.73m2    GFR est non-AA >60 >60 ml/min/1.73m2    Calcium 9.3 8.5 - 10.1 MG/DL   MAGNESIUM    Collection Time: 11/07/19  3:26 AM   Result Value Ref Range    Magnesium 2.1 1.6 - 2.6 mg/dL   PHOSPHORUS    Collection Time: 11/07/19  3:26 AM   Result Value Ref Range    Phosphorus 1.5 (L) 2.5 - 4.9 MG/DL   CBC WITH AUTOMATED DIFF    Collection Time: 11/07/19  3:26 AM   Result Value Ref Range    WBC 12.8 4.6 - 13.2 K/uL    RBC 5.10 4.70 - 5.50 M/uL    HGB 15.2 13.0 - 16.0 g/dL    HCT 43.0 36.0 - 48.0 %    MCV 84.3 74.0 - 97.0 FL    MCH 29.8 24.0 - 34.0 PG    MCHC 35.3 31.0 - 37.0 g/dL    RDW 14.5 11.6 - 14.5 %    PLATELET 510 131 - 685 K/uL    MPV 10.8 9.2 - 11.8 FL    NEUTROPHILS 87 (H) 40 - 73 %    LYMPHOCYTES 10 (L) 21 - 52 %    MONOCYTES 3 3 - 10 %    EOSINOPHILS 0 0 - 5 %    BASOPHILS 0 0 - 2 %    ABS. NEUTROPHILS 11.2 (H) 1.8 - 8.0 K/UL    ABS. LYMPHOCYTES 1.3 0.9 - 3.6 K/UL    ABS. MONOCYTES 0.4 0.05 - 1.2 K/UL    ABS. EOSINOPHILS 0.0 0.0 - 0.4 K/UL    ABS.  BASOPHILS 0.0 0.0 - 0.1 K/UL    DF AUTOMATED     LIPID PANEL Collection Time: 11/07/19  3:26 AM   Result Value Ref Range    LIPID PROFILE          Cholesterol, total 146 <200 MG/DL    Triglyceride 53 <150 MG/DL    HDL Cholesterol 79 (H) 40 - 60 MG/DL    LDL, calculated 56.4 0 - 100 MG/DL    VLDL, calculated 10.6 MG/DL    CHOL/HDL Ratio 1.8 0 - 5.0     CARDIAC PANEL,(CK, CKMB & TROPONIN)    Collection Time: 11/07/19  3:26 AM   Result Value Ref Range    CK 1,649 (H) 39 - 308 U/L    CK - MB 12.8 (H) <3.6 ng/ml    CK-MB Index 0.8 0.0 - 4.0 %    Troponin-I, QT 0.10 (H) 0.0 - 0.045 NG/ML   GLUCOSE, POC    Collection Time: 11/07/19  3:36 AM   Result Value Ref Range    Glucose (POC) 119 (H) 70 - 110 mg/dL   GLUCOSE, POC    Collection Time: 11/07/19  4:21 AM   Result Value Ref Range    Glucose (POC) 121 (H) 70 - 110 mg/dL   GLUCOSE, POC    Collection Time: 11/07/19  5:28 AM   Result Value Ref Range    Glucose (POC) 137 (H) 70 - 110 mg/dL   GLUCOSE, POC    Collection Time: 11/07/19  6:10 AM   Result Value Ref Range    Glucose (POC) 161 (H) 70 - 110 mg/dL   GLUCOSE, POC    Collection Time: 11/07/19  7:12 AM   Result Value Ref Range    Glucose (POC) 133 (H) 70 - 110 mg/dL   GLUCOSE, POC    Collection Time: 11/07/19  8:07 AM   Result Value Ref Range    Glucose (POC) 128 (H) 70 - 110 mg/dL   EKG, 12 LEAD, SUBSEQUENT    Collection Time: 11/07/19  9:05 AM   Result Value Ref Range    Ventricular Rate 57 BPM    Atrial Rate 57 BPM    P-R Interval 220 ms    QRS Duration 110 ms    Q-T Interval 394 ms    QTC Calculation (Bezet) 383 ms    Calculated P Axis 65 degrees    Calculated R Axis 80 degrees    Calculated T Axis 71 degrees    Diagnosis       Sinus bradycardia with 1st degree AV block  Incomplete right bundle branch block  Nonspecific ST abnormality  Abnormal ECG  When compared with ECG of 06-NOV-2019 18:05,  Incomplete right bundle branch block has replaced RSR' pattern in V1     CARDIAC PANEL,(CK, CKMB & TROPONIN)    Collection Time: 11/07/19  9:30 AM   Result Value Ref Range    CK 1,929 (H) 39 - 308 U/L    CK - MB 11.7 (H) <3.6 ng/ml    CK-MB Index 0.6 0.0 - 4.0 %    Troponin-I, QT 0.05 (H) 0.0 - 0.045 NG/ML   ECHO ADULT COMPLETE    Collection Time: 11/07/19  9:54 AM   Result Value Ref Range    LA Volume 30.79 18 - 58 mL    LV E' Lateral Velocity 14.26 cm/s    Tapse 1.77 1.5 - 2.0 cm    Ao Root D 2.23 cm    LVIDd 3.93 (A) 4.2 - 5.9 cm    LVPWd 1.07 (A) 0.6 - 1.0 cm    LVIDs 3.21 cm    IVSd 0.64 0.6 - 1.0 cm    LV ED Vol A2C 85.4 mL    LV ES Vol A4C 33.1 mL    LV ES Vol BP 36.4 22 - 58 mL    LVOT d 1.75 cm    LVOT Peak Velocity 95.98 cm/s    LVOT Peak Gradient 3.7 mmHg    LVOT VTI 17.83 cm    MV A Samson 63.88 cm/s    MV E Samson 51.40 cm/s    MV E/A 0.80     BP EF 56.6 55 - 100 %    LV Ejection Fraction MOD 4C 60 %    LV Ejection Fraction MOD 2C 55 %    LA Vol 4C 23.72 18 - 58 mL    LA Vol 2C 23.75 18 - 58 mL    LA Area 4C 12.2 cm2    LV Mass .7 88 - 224 g    LV Mass AL Index 54.5 49 - 115 g/m2    E/E' lateral 3.60     LV ES Vol A2C 38.3 mL    LVES Vol Index BP 18.1 mL/m2    LV ED Vol A4C 82.3 mL    LVED Vol Index BP 41.7 mL/m2    Mitral Valve E Wave Deceleration Time 180.1 ms    Triscuspid Valve Regurgitation Peak Gradient 10.8 mmHg    LV ED Vol BP 84.0 67 - 155 ml    TR Max Velocity 164.34 cm/s    LA Vol Index 15.30 16 - 28 ml/m2    LA Vol Index 11.80 16 - 28 ml/m2    LA Vol Index 11.78 16 - 28 ml/m2    LVED Vol Index A4C 40.9 mL/m2    LVED Vol Index A2C 42.4 mL/m2    LVES Vol Index A4C 16.4 mL/m2    LVES Vol Index A2C 19.0 mL/m2    Left Ventricular Fractional Shortening by 2D 97.321761181 %    Left Ventricular Outflow Tract Mean Gradient 0.9555504124561 mmHg    Left Ventricular Outflow Tract Mean Velocity 7.17443528463234 cm/s    Mitral Valve Deceleration Calvert 0.3707781868431     Left Ventricular End Diastolic Volume by Teichholz Method 95.611743765 mL    Left Ventricular End Systolic Volume by Teichholz Method 22.015566119 mL    Left Ventricular Stroke Volume by 2-D Biplane-MOD 23.539730098 mL Left Ventricular Stroke Volume by 2-D Single Plane- MOD 71.276452253 mL    Left Ventricular Stroke Volume by Teichholz Method 80.537234868 mL    Left Ventricular Stroke Volume by 2-D Single Plane- MOD 94.088334720 mL   GLUCOSE, POC    Collection Time: 11/07/19 10:10 AM   Result Value Ref Range    Glucose (POC) 145 (H) 70 - 110 mg/dL   GLUCOSE, POC    Collection Time: 11/07/19 11:10 AM   Result Value Ref Range    Glucose (POC) 105 70 - 110 mg/dL   GLUCOSE, POC    Collection Time: 11/07/19 11:47 AM   Result Value Ref Range    Glucose (POC) 100 70 - 110 mg/dL   GLUCOSE, POC    Collection Time: 11/07/19 12:50 PM   Result Value Ref Range    Glucose (POC) 114 (H) 70 - 110 mg/dL   GLUCOSE, POC    Collection Time: 11/07/19  1:56 PM   Result Value Ref Range    Glucose (POC) 89 70 - 110 mg/dL   GLUCOSE, POC    Collection Time: 11/07/19  3:20 PM   Result Value Ref Range    Glucose (POC) 125 (H) 70 - 110 mg/dL       Signed By: Ximena Corea MD     November 7, 2019

## 2019-11-07 NOTE — ED NOTES
7:00 PM: Transport is here in the ED. Repeat cardiac panel shows troponin has increased from 0.11 to 0.26. ST elevation in V3 without reciprocal changes. No STEMI. Dr. Jb Burnette confirms no STEMI. Spoke with cardiology, NP Rafaela Cheung. She is in agreement with plan to give ASA and one dose of 1mg/kg Lovenox. She asks that Dr. Mendoza Oh be assigned to patient. They will see him in the morning. Maria Esther Chacko PA-C      9231 PM:  Spoke with Dr. Dafne Burgess, hospitalist, and updated her on increasing troponin, my discussion with cardiology, and the addition of ASA and Lovenox. Pt receiving an additional bolus of D50 prior to transport.   Maria Esther Chacko PA-C

## 2019-11-08 LAB
ANION GAP SERPL CALC-SCNC: 5 MMOL/L (ref 3–18)
ANION GAP SERPL CALC-SCNC: 6 MMOL/L (ref 3–18)
BASOPHILS # BLD: 0 K/UL (ref 0–0.1)
BASOPHILS NFR BLD: 0 % (ref 0–2)
BUN SERPL-MCNC: 12 MG/DL (ref 7–18)
BUN SERPL-MCNC: 14 MG/DL (ref 7–18)
BUN/CREAT SERPL: 11 (ref 12–20)
BUN/CREAT SERPL: 8 (ref 12–20)
CALCIUM SERPL-MCNC: 8.5 MG/DL (ref 8.5–10.1)
CALCIUM SERPL-MCNC: 8.9 MG/DL (ref 8.5–10.1)
CHLORIDE SERPL-SCNC: 104 MMOL/L (ref 100–111)
CHLORIDE SERPL-SCNC: 105 MMOL/L (ref 100–111)
CO2 SERPL-SCNC: 28 MMOL/L (ref 21–32)
CO2 SERPL-SCNC: 30 MMOL/L (ref 21–32)
CREAT SERPL-MCNC: 1.24 MG/DL (ref 0.6–1.3)
CREAT SERPL-MCNC: 1.44 MG/DL (ref 0.6–1.3)
CREAT UR-MCNC: 72 MG/DL (ref 30–125)
DIFFERENTIAL METHOD BLD: ABNORMAL
EOSINOPHIL # BLD: 0 K/UL (ref 0–0.4)
EOSINOPHIL NFR BLD: 0 % (ref 0–5)
ERYTHROCYTE [DISTWIDTH] IN BLOOD BY AUTOMATED COUNT: 14.4 % (ref 11.6–14.5)
GLUCOSE BLD STRIP.AUTO-MCNC: 104 MG/DL (ref 70–110)
GLUCOSE BLD STRIP.AUTO-MCNC: 115 MG/DL (ref 70–110)
GLUCOSE BLD STRIP.AUTO-MCNC: 129 MG/DL (ref 70–110)
GLUCOSE BLD STRIP.AUTO-MCNC: 131 MG/DL (ref 70–110)
GLUCOSE BLD STRIP.AUTO-MCNC: 134 MG/DL (ref 70–110)
GLUCOSE BLD STRIP.AUTO-MCNC: 61 MG/DL (ref 70–110)
GLUCOSE BLD STRIP.AUTO-MCNC: 69 MG/DL (ref 70–110)
GLUCOSE BLD STRIP.AUTO-MCNC: 69 MG/DL (ref 70–110)
GLUCOSE BLD STRIP.AUTO-MCNC: 81 MG/DL (ref 70–110)
GLUCOSE BLD STRIP.AUTO-MCNC: 86 MG/DL (ref 70–110)
GLUCOSE BLD STRIP.AUTO-MCNC: 87 MG/DL (ref 70–110)
GLUCOSE SERPL-MCNC: 113 MG/DL (ref 74–99)
GLUCOSE SERPL-MCNC: 83 MG/DL (ref 74–99)
HCT VFR BLD AUTO: 42.9 % (ref 36–48)
HGB BLD-MCNC: 15.1 G/DL (ref 13–16)
LYMPHOCYTES # BLD: 3.8 K/UL (ref 0.9–3.6)
LYMPHOCYTES NFR BLD: 41 % (ref 21–52)
MCH RBC QN AUTO: 30 PG (ref 24–34)
MCHC RBC AUTO-ENTMCNC: 35.2 G/DL (ref 31–37)
MCV RBC AUTO: 85.3 FL (ref 74–97)
MONOCYTES # BLD: 1 K/UL (ref 0.05–1.2)
MONOCYTES NFR BLD: 11 % (ref 3–10)
NEUTS SEG # BLD: 4.5 K/UL (ref 1.8–8)
NEUTS SEG NFR BLD: 48 % (ref 40–73)
PLATELET # BLD AUTO: 270 K/UL (ref 135–420)
PMV BLD AUTO: 10.5 FL (ref 9.2–11.8)
POTASSIUM SERPL-SCNC: 3.5 MMOL/L (ref 3.5–5.5)
POTASSIUM SERPL-SCNC: 3.8 MMOL/L (ref 3.5–5.5)
PROT UR-MCNC: 6 MG/DL
RBC # BLD AUTO: 5.03 M/UL (ref 4.7–5.5)
SODIUM SERPL-SCNC: 138 MMOL/L (ref 136–145)
SODIUM SERPL-SCNC: 140 MMOL/L (ref 136–145)
WBC # BLD AUTO: 9.3 K/UL (ref 4.6–13.2)

## 2019-11-08 PROCEDURE — 80048 BASIC METABOLIC PNL TOTAL CA: CPT

## 2019-11-08 PROCEDURE — 84156 ASSAY OF PROTEIN URINE: CPT

## 2019-11-08 PROCEDURE — 85025 COMPLETE CBC W/AUTO DIFF WBC: CPT

## 2019-11-08 PROCEDURE — 36415 COLL VENOUS BLD VENIPUNCTURE: CPT

## 2019-11-08 PROCEDURE — 82962 GLUCOSE BLOOD TEST: CPT

## 2019-11-08 PROCEDURE — 74011250636 HC RX REV CODE- 250/636: Performed by: INTERNAL MEDICINE

## 2019-11-08 PROCEDURE — 65660000004 HC RM CVT STEPDOWN

## 2019-11-08 PROCEDURE — 94762 N-INVAS EAR/PLS OXIMTRY CONT: CPT

## 2019-11-08 PROCEDURE — 74011250636 HC RX REV CODE- 250/636: Performed by: HOSPITALIST

## 2019-11-08 PROCEDURE — 82570 ASSAY OF URINE CREATININE: CPT

## 2019-11-08 PROCEDURE — 74011000258 HC RX REV CODE- 258: Performed by: INTERNAL MEDICINE

## 2019-11-08 RX ADMIN — SODIUM CHLORIDE, SODIUM ACETATE ANHYDROUS, SODIUM GLUCONATE, POTASSIUM CHLORIDE, AND MAGNESIUM CHLORIDE: 526; 222; 502; 37; 30 INJECTION, SOLUTION INTRAVENOUS at 20:31

## 2019-11-08 RX ADMIN — OCTREOTIDE ACETATE 50 MCG: 50 INJECTION, SOLUTION INTRAVENOUS; SUBCUTANEOUS at 02:09

## 2019-11-08 RX ADMIN — OCTREOTIDE ACETATE 50 MCG: 50 INJECTION, SOLUTION INTRAVENOUS; SUBCUTANEOUS at 08:38

## 2019-11-08 RX ADMIN — ENOXAPARIN SODIUM 40 MG: 40 INJECTION SUBCUTANEOUS at 10:37

## 2019-11-08 RX ADMIN — SODIUM CHLORIDE, SODIUM ACETATE ANHYDROUS, SODIUM GLUCONATE, POTASSIUM CHLORIDE, AND MAGNESIUM CHLORIDE: 526; 222; 502; 37; 30 INJECTION, SOLUTION INTRAVENOUS at 13:15

## 2019-11-08 NOTE — PROGRESS NOTES
[facility]      CARDIOLOGY PROGRESS NOTE  RECS:      1. Elevated troponin and CKMB - Likely in the setting of  demand ischemia and rhabdomyolysis- patient is  stable no chest pain no chest pressure no SOB. 2. Persistent Hypoglycemia- in the setting of recent use of V8 (male enhancement OTC drug). currently off D10%  3. Tobacco use- dicussed cessation     No acute cardiac w/u at this time. Will f./u peripherally      Echo 11/19  · Left Ventricle: Normal cavity size, wall thickness and systolic function (ejection fraction normal). Estimated left ventricular ejection fraction is 51 - 55%. Biplane method used to measure ejection fraction. No regional wall motion abnormality noted. Age-appropriate left ventricular diastolic function. · Pulmonary Artery: Pulmonary arterial systolic pressure is 14 mmHg.     ASSESSMENT:  Hospital Problems  Date Reviewed: 11/6/2019          Codes Class Noted POA    * (Principal) Hypoglycemia ICD-10-CM: E16.2  ICD-9-CM: 251.2  11/6/2019 Unknown        Adverse effect of over-the-counter medication ICD-10-CM: T50.905A  ICD-9-CM: E947.9  11/6/2019 Unknown        Elevated troponin ICD-10-CM: R79.89  ICD-9-CM: 790.6  11/6/2019 Unknown        Elevated CK ICD-10-CM: R74.8  ICD-9-CM: 790.5  11/6/2019 Unknown        Elevated CK-MB level ICD-10-CM: R74.8  ICD-9-CM: 790.5  11/6/2019 Unknown        Seizure-like activity ([facility] [address] ICD-10-CM: R56.9  ICD-9-CM: 780.39  11/6/2019 Unknown                SUBJECTIVE:  No CP  No SOB    OBJECTIVE:    VS:   Visit Vitals  BP (!) 121/94   Pulse 62   Temp 98 °F (36.7 °C)   Resp 14   Ht 6' (1.829 m)   Wt 79.4 kg (175 lb)   SpO2 96%   BMI 23.73 kg/m²         Intake/Output Summary (Last 24 hours) at 11/8/2019 0854  Last data filed at 11/8/2019 0210  Gross per 24 hour   Intake 1534.17 ml   Output 1225 ml   Net 309.17 ml     TELE: normal sinus rhythm    General: alert, well developed, pleasant and in no apparent distress  HENT: Normocephalic, atraumatic. Normal external eye. Neck :  no bruit, no JVD  Cardiac:  regular rate and rhythm, S1, S2 normal, no murmur, click, rub or gallop  Lungs: clear to auscultation bilaterally  Abdomen: Soft, nontender, no masses  Extremities:  No c/c/e, peripheral pulses present      Labs: Results:       Chemistry Recent Labs     11/08/19  0606 11/07/19  0326 11/06/19  1630   * 114* 47*    138 140   K 3.8 4.4 4.0    107 106   CO2 28 27 31   BUN 12 7 7   CREA 1.44* 1.08 1.01   CA 8.9 9.3 9.4   AGAP 6 4 3   BUCR 8* 6* 7*   AP  --   --  55   TP  --   --  7.3   ALB  --   --  3.8   GLOB  --   --  3.5   AGRAT  --   --  1.1      CBC w/Diff Recent Labs     11/08/19  0606 11/07/19  0326 11/06/19  1630   WBC 9.3 12.8 8.7   RBC 5.03 5.10 5.18   HGB 15.1 15.2 15.1   HCT 42.9 43.0 43.9    259 278   GRANS 48 87* 83*   LYMPH 41 10* 10*   EOS 0 0 0      Cardiac Enzymes Recent Labs     11/07/19  0930 11/07/19  0326   CPK 1,929* 1,649*   CKND1 0.6 0.8      Coagulation No results for input(s): PTP, INR, APTT, INREXT in the last 72 hours. Lipid Panel Lab Results   Component Value Date/Time    Cholesterol, total 146 11/07/2019 03:26 AM    HDL Cholesterol 79 (H) 11/07/2019 03:26 AM    LDL, calculated 56.4 11/07/2019 03:26 AM    VLDL, calculated 10.6 11/07/2019 03:26 AM    Triglyceride 53 11/07/2019 03:26 AM    CHOL/HDL Ratio 1.8 11/07/2019 03:26 AM      BNP No results for input(s): BNPP in the last 72 hours. Liver Enzymes Recent Labs     11/06/19  1630   TP 7.3   ALB 3.8   AP 55   SGOT 20      Thyroid Studies No results found for: T4, T3U, TSH, TSHEXT           Shagufta BROTHERS Rachel:[phone] supervised    I have independently evaluated and examined the patient. All relevant labs and testing data's are reviewed. Care plan discussed and updated after review.     Tyler Cuevas MD

## 2019-11-08 NOTE — PROGRESS NOTES
Pt does not have a pcp. Gave him [facility] information and stated he will go there when discharged. Pt will will need indigent medications if prescribed.       SPIKE ChampagneN RN  Care Management  Pager: [phone]

## 2019-11-08 NOTE — PROGRESS NOTES
0700: Assumed care of patient in bed appears to be sleeping, NAD noted, girlfriend at bedside, will continue to monitor patient. 1055: Dr Barbara Chavarria at bedside to see patient. 1430: Dr Gissel Hatfield at bedside to see patient  [phone]: Patient's BG is 64, patient is asymptomatic. Gave patient ginger ale.   1642: Repeat BG is 87 patient is having girlfriend bring in food from home.   1900: Bedside and Verbal shift change report given to Len Moeller RN (oncoming nurse) by Chapis Da Silva RN (offgoing nurse). Report included the following information SBAR, Kardex, Intake/Output, MAR, Recent Results and Cardiac Rhythm SB-SR c 1 AVB.

## 2019-11-08 NOTE — CONSULTS
Consult Note      Consult requested by: Bereket Morales MD    ADMIT DATE: 11/6/2019    CONSULT DATE: November 8, 2019           Admission diagnosis: Hypoglycemia   Reason for Nephrology Consultation: PARRISH      Assessment and plan:    1. 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 Urine studies have been sent to differentiate. 2 rhabdomyolysis, from seizures or V8 male enhancing drug  3 elevated CKs/rhabdomyolysis  4 hypoglycemia  5 drug ingestion male enhancing drug V8  6 elevated troponins    Clinically patient feels much better. His CKs are still trending up slowly. His creatinine went up to 1.4. Still making good urine I would continue hydration. Plan:    1 start hydration with Normosol at 125 cc an hour  2 recheck BMP this evening  3 trend CKs  4 avoid NSAIDs or nephrotoxins    Please call with questions    Philip Prescott MD [facility]  Cell [phone]  Pager: [phone]      HPI:   Patient is a 28-year-old -American male with no significant past medical history, history of marijuana use, smoking tobacco, who presented to the emergency room after was found down with altered mental status and possible seizure. He was noted to be hypoglycemic in the emergency room. Patient mentions that his girlfriend found him on the floor unresponsive staring and aphasic and having seizure-like jerking movements of his extremities. He was brought to the emergency room, found to be hypoglycemic and started on dextrose infusion. Patient mentions that he took male and non-sick drug called V8 couple of days ago, subsequently started having shaking sweating palpitations, also developed muscle aches. In the ER he was also found to be having elevated troponin. It was noted that patient's creatinine which was 1.0 on admission became elevated at 1.4. Also patient CKs have been trending up therefore nephrology was consulted         History reviewed. No pertinent past medical history. History reviewed. No pertinent surgical history. Social History     Socioeconomic History    Marital status: SINGLE     Spouse name: Not on file    Number of children: Not on file    Years of education: Not on file    Highest education level: Not on file   Occupational History    Not on file   Social Needs    Financial resource strain: Not on file    Food insecurity:     Worry: Not on file     Inability: Not on file    Transportation needs:     Medical: Not on file     Non-medical: Not on file   Tobacco Use    Smoking status: Former Smoker     Packs/day: 0.25     Last attempt to quit: 10/1/2019     Years since quittin.1    Smokeless tobacco: Never Used   Substance and Sexual Activity    Alcohol use: Yes    Drug use: No    Sexual activity: Not on file   Lifestyle    Physical activity:     Days per week: Not on file     Minutes per session: Not on file    Stress: Not on file   Relationships    Social connections:     Talks on phone: Not on file     Gets together: Not on file     Attends Yazidism service: Not on file     Active member of club or organization: Not on file     Attends meetings of clubs or organizations: Not on file     Relationship status: Not on file    Intimate partner violence:     Fear of current or ex partner: Not on file     Emotionally abused: Not on file     Physically abused: Not on file     Forced sexual activity: Not on file   Other Topics Concern    Not on file   Social History Narrative    Not on file       History reviewed. No pertinent family history. No Known Allergies     Home Medications:     No medications prior to admission.        Current Inpatient Medications:     Current Facility-Administered Medications   Medication Dose Route Frequency    electrolyte-r (NORMOSOL R) infusion   IntraVENous CONTINUOUS    glucose chewable tablet 16 g  4 Tab Oral PRN    glucagon (GLUCAGEN) injection 1 mg  1 mg IntraMUSCular PRN    dextrose 10% infusion 125-250 mL  125-250 mL IntraVENous PRN    acetaminophen (TYLENOL) tablet 650 mg  650 mg Oral Q6H PRN    naloxone (NARCAN) injection 0.4 mg  0.4 mg IntraVENous PRN    ondansetron (ZOFRAN) injection 4 mg  4 mg IntraVENous Q6H PRN    docusate sodium (COLACE) capsule 100 mg  100 mg Oral BID PRN    enoxaparin (LOVENOX) injection 40 mg  40 mg SubCUTAneous Q24H       Review of Systems:   No fever or chills. No sore throat. No cough or hemoptysis. No shortness of breath or chest pain. No orthopnea or paroxysmal nocturnal dyspnea. No nausea, vomiting, abdominal pain, melena or hematochezia. No constipation or diarrhea. No dysuria, no gross hematuria of voiding difficulties. No ankle swelling, no joint paints. No muscle aches. No skin changes. No dizziness or lightheadedness. No headaches. Physical Assessment:     Vitals:    11/08/19 1100 11/08/19 1200 11/08/19 1300 11/08/19 1400   BP:  (!) 133/97  106/70   Pulse: 60 (!) 53 (!) 56 (!) 56   Resp: 14 12 11 14   Temp:  98.4 °F (36.9 °C)     SpO2: 99% 100% 100% 99%   Weight:       Height:         Last 3 Recorded Weights in this Encounter    11/06/19 1654 11/07/19 0856   Weight: 79.4 kg (175 lb) 79.4 kg (175 lb)     Admission weight: Weight: 79.4 kg (175 lb) (11/06/19 1654)      Intake/Output Summary (Last 24 hours) at 11/8/2019 1533  Last data filed at 11/8/2019 1315  Gross per 24 hour   Intake 1275 ml   Output 875 ml   Net 400 ml       Patient is in no apparent distress. HEENT: mmm  Neck: no cervical lymphadenopathy or thyromegaly. Lungs: good air entry, clear to auscultation bilaterally. Trachea at the midline. Cardiovascular system: S1, S2, regular rate and rhythm. Abdomen: soft, non tender, non distended. BS+. Extremities: no edema   Integumentary: skin is grossly intact. Neurologic: Alert, oriented time three.       Data Review:    Labs: Results:       Chemistry Recent Labs     11/08/19  0606 11/07/19  0326 11/06/19  1630   GLU 113* 114* 47*    138 140   K 3.8 4.4 4.0    107 106   CO2 28 27 31   BUN 12 7 7   CREA 1.44* 1.08 1.01   CA 8.9 9.3 9.4   AGAP 6 4 3   BUCR 8* 6* 7*   AP  --   --  55   TP  --   --  7.3   ALB  --   --  3.8   GLOB  --   --  3.5   AGRAT  --   --  1.1   PHOS  --  1.5*  --          CBC w/Diff Recent Labs     11/08/19  0606 11/07/19  0326 11/06/19  1630   WBC 9.3 12.8 8.7   RBC 5.03 5.10 5.18   HGB 15.1 15.2 15.1   HCT 42.9 43.0 43.9    259 278   GRANS 48 87* 83*   LYMPH 41 10* 10*   EOS 0 0 0         Iron/Ferritin No results for input(s): IRON in the last 72 hours. No lab exists for component: TIBCCALC   PTH/VIT D No results for input(s): PTH in the last 72 hours.     No lab exists for component: VITD           Nina Becerril MD  11/8/2019  3:33 PM      November 8, 2019

## 2019-11-08 NOTE — PROGRESS NOTES
[facility]  Progress Note    Patient: Loco Billy Age: 29 y.o. : 1985 MR#: [id number] SSN: [id number]  Date/Time: 2019     Subjective:     Pt seen & evaluated - improved this AM       Assessment/Plan:     1. 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 - Stopped D10 gtt & also Octreotide - BS stable - per poison control - monitor BS for 6 hrs   2. Rhabdo - continue IVF - NS   3. PARRISH - consulted Nephrology - monitor for another 24 hrs - repeat labs in AM   DVT px - Lovenox   FC           Case discussed with:  [x]Patient  []Family  [x]Nursing  []Case Management  DVT Prophylaxis:  [x]Lovenox  []Hep SQ  []SCDs  []Coumadin   []On Heparin gtt    Objective:   VS:   Visit Vitals  BP (!) 133/97 (BP 1 Location: Left arm, BP Patient Position: At rest)   Pulse (!) 56   Temp 98.4 °F (36.9 °C)   Resp 11   Ht 6' (1.829 m)   Wt 79.4 kg (175 lb)   SpO2 100%   BMI 23.73 kg/m²      Tmax/24hrs: Temp (24hrs), Av °F (36.7 °C), Min:97.7 °F (36.5 °C), Max:98.4 °F (36.9 °C)  IOBRIEF    Intake/Output Summary (Last 24 hours) at 2019 1410  Last data filed at 2019 1315  Gross per 24 hour   Intake 1275 ml   Output 875 ml   Net 400 ml       General:  Alert, cooperative, no acute distress    HEENT: PERRLA, anicteric sclerae. Pulmonary:  CTA Bilaterally. No Wheezing/Rhonchi/Rales. Cardiovascular: Regular rate and Rhythm. GI:  Soft, Non distended, Non tender. + Bowel sounds. Extremities:  No edema, cyanosis, clubbing. No calf tenderness. Neurologic: Alert and oriented X 3. No acute neuro deficits.   Additional:    Medications:   Current Facility-Administered Medications   Medication Dose Route Frequency    electrolyte-r (NORMOSOL R) infusion   IntraVENous CONTINUOUS    glucose chewable tablet 16 g  4 Tab Oral PRN    glucagon (GLUCAGEN) injection 1 mg  1 mg IntraMUSCular PRN    dextrose 10% infusion 125-250 mL  125-250 mL IntraVENous PRN    acetaminophen (TYLENOL) tablet 650 mg  650 mg Oral Q6H PRN    naloxone (NARCAN) injection 0.4 mg  0.4 mg IntraVENous PRN    ondansetron (ZOFRAN) injection 4 mg  4 mg IntraVENous Q6H PRN    docusate sodium (COLACE) capsule 100 mg  100 mg Oral BID PRN    enoxaparin (LOVENOX) injection 40 mg  40 mg SubCUTAneous Q24H       Labs:    Recent Results (from the past 48 hour(s))   GLUCOSE, POC    Collection Time: 11/06/19  3:53 PM   Result Value Ref Range    Glucose (POC) 86 70 - 110 mg/dL   GLUCOSE, POC    Collection Time: 11/06/19  4:16 PM   Result Value Ref Range    Glucose (POC) 73 70 - 110 mg/dL   EKG, 12 LEAD, INITIAL    Collection Time: 11/06/19  4:27 PM   Result Value Ref Range    Ventricular Rate 53 BPM    Atrial Rate 53 BPM    P-R Interval 184 ms    QRS Duration 100 ms    Q-T Interval 400 ms    QTC Calculation (Bezet) 375 ms    Calculated P Axis 97 degrees    Calculated R Axis 92 degrees    Calculated T Axis 72 degrees    Diagnosis       Suspect arm lead reversal, interpretation assumes no reversal  Sinus bradycardia  Rightward axis  Incomplete right bundle branch block  Borderline ECG  When compared with ECG of 22-AUG-2013 15:23,  MS interval has decreased  Confirmed by Roxana Shannon MD, Reg Cavazos ([id number]) on 11/7/2019 5:07:30 PM     CBC WITH AUTOMATED DIFF    Collection Time: 11/06/19  4:30 PM   Result Value Ref Range    WBC 8.7 4.6 - 13.2 K/uL    RBC 5.18 4.70 - 5.50 M/uL    HGB 15.1 13.0 - 16.0 g/dL    HCT 43.9 36.0 - 48.0 %    MCV 84.7 74.0 - 97.0 FL    MCH 29.2 24.0 - 34.0 PG    MCHC 34.4 31.0 - 37.0 g/dL    RDW 14.2 11.6 - 14.5 %    PLATELET 450 676 - 684 K/uL    MPV 9.8 9.2 - 11.8 FL    NEUTROPHILS 83 (H) 40 - 73 %    LYMPHOCYTES 10 (L) 21 - 52 %    MONOCYTES 7 3 - 10 %    EOSINOPHILS 0 0 - 5 %    BASOPHILS 0 0 - 2 %    ABS. NEUTROPHILS 7.3 1.8 - 8.0 K/UL    ABS. LYMPHOCYTES 0.8 (L) 0.9 - 3.6 K/UL    ABS. MONOCYTES 0.6 0.05 - 1.2 K/UL    ABS. EOSINOPHILS 0.0 0.0 - 0.4 K/UL    ABS.  BASOPHILS 0.0 0.0 - 0.1 K/UL    DF AUTOMATED METABOLIC PANEL, COMPREHENSIVE    Collection Time: 11/06/19  4:30 PM   Result Value Ref Range    Sodium 140 136 - 145 mmol/L    Potassium 4.0 3.5 - 5.5 mmol/L    Chloride 106 100 - 111 mmol/L    CO2 31 21 - 32 mmol/L    Anion gap 3 3.0 - 18 mmol/L    Glucose 47 (LL) 74 - 99 mg/dL    BUN 7 7.0 - 18 MG/DL    Creatinine 1.01 0.6 - 1.3 MG/DL    BUN/Creatinine ratio 7 (L) 12 - 20      GFR est AA >60 >60 ml/min/1.73m2    GFR est non-AA >60 >60 ml/min/1.73m2    Calcium 9.4 8.5 - 10.1 MG/DL    Bilirubin, total 0.5 0.2 - 1.0 MG/DL    ALT (SGPT) 25 16 - 61 U/L    AST (SGOT) 20 10 - 38 U/L    Alk.  phosphatase 55 45 - 117 U/L    Protein, total 7.3 6.4 - 8.2 g/dL    Albumin 3.8 3.4 - 5.0 g/dL    Globulin 3.5 2.0 - 4.0 g/dL    A-G Ratio 1.1 0.8 - 1.7     ETHYL ALCOHOL    Collection Time: 11/06/19  4:30 PM   Result Value Ref Range    ALCOHOL(ETHYL),SERUM <3 0 - 3 MG/DL   CARDIAC PANEL,(CK, CKMB & TROPONIN)    Collection Time: 11/06/19  4:30 PM   Result Value Ref Range     (H) 39 - 308 U/L    CK - MB 3.6 (H) <3.6 ng/ml    CK-MB Index 1.0 0.0 - 4.0 %    Troponin-I, QT 0.11 (H) 0.0 - 0.045 NG/ML   GLUCOSE, POC    Collection Time: 11/06/19  5:06 PM   Result Value Ref Range    Glucose (POC) 102 70 - 110 mg/dL   GLUCOSE, POC    Collection Time: 11/06/19  5:50 PM   Result Value Ref Range    Glucose (POC) 85 70 - 110 mg/dL   GLUCOSE, POC    Collection Time: 11/06/19  6:05 PM   Result Value Ref Range    Glucose (POC) 89 70 - 110 mg/dL   EKG, 12 LEAD, SUBSEQUENT    Collection Time: 11/06/19  6:05 PM   Result Value Ref Range    Ventricular Rate 65 BPM    Atrial Rate 65 BPM    P-R Interval 240 ms    QRS Duration 88 ms    Q-T Interval 368 ms    QTC Calculation (Bezet) 382 ms    Calculated P Axis 57 degrees    Calculated R Axis 96 degrees    Calculated T Axis 72 degrees    Diagnosis       Sinus rhythm with sinus arrhythmia with 1st degree AV block  Rightward axis  RSR' or QR pattern in V1 suggests right ventricular conduction delay  ST elevation, consider early repolarization, pericarditis, or injury  Abnormal ECG  When compared with ECG of 06-NOV-2019 16:27,  UT interval has increased  Confirmed by Christiano Stewart MD, Christiana Godwin ([id number]) on 11/7/2019 5:09:39 PM     CARDIAC PANEL,(CK, CKMB & TROPONIN)    Collection Time: 11/06/19  6:10 PM   Result Value Ref Range     (H) 39 - 308 U/L    CK - MB 4.7 (H) <3.6 ng/ml    CK-MB Index 0.8 0.0 - 4.0 %    Troponin-I, QT 0.26 (H) 0.0 - 0.045 NG/ML   GLUCOSE, POC    Collection Time: 11/06/19  6:33 PM   Result Value Ref Range    Glucose (POC) 78 70 - 110 mg/dL   GLUCOSE, POC    Collection Time: 11/06/19  7:20 PM   Result Value Ref Range    Glucose (POC) 147 (H) 70 - 110 mg/dL   URINALYSIS W/ RFLX MICROSCOPIC    Collection Time: 11/06/19  7:45 PM   Result Value Ref Range    Color YELLOW      Appearance CLEAR      Specific gravity 1.007 1.005 - 1.030      pH (UA) 6.0 5.0 - 8.0      Protein 100 (A) NEG mg/dL    Glucose 500 (A) NEG mg/dL    Ketone NEGATIVE  NEG mg/dL    Bilirubin NEGATIVE  NEG      Blood NEGATIVE  NEG      Urobilinogen 0.2 0.2 - 1.0 EU/dL    Nitrites NEGATIVE  NEG      Leukocyte Esterase NEGATIVE  NEG     DRUG SCREEN, URINE    Collection Time: 11/06/19  7:45 PM   Result Value Ref Range    BENZODIAZEPINES NEGATIVE  NEG      BARBITURATES NEGATIVE  NEG      THC (TH-CANNABINOL) POSITIVE (A) NEG      OPIATES NEGATIVE  NEG      PCP(PHENCYCLIDINE) NEGATIVE  NEG      COCAINE NEGATIVE  NEG      AMPHETAMINES NEGATIVE  NEG      METHADONE NEGATIVE  NEG      HDSCOM (NOTE)    URINE MICROSCOPIC ONLY    Collection Time: 11/06/19  7:45 PM   Result Value Ref Range    WBC NONE 0 - 4 /hpf    RBC NONE 0 - 5 /hpf    Epithelial cells NEGATIVE  0 - 5 /lpf    Bacteria NEGATIVE  NEG /hpf   GLUCOSE, POC    Collection Time: 11/06/19  8:28 PM   Result Value Ref Range    Glucose (POC) 108 70 - 110 mg/dL   GLUCOSE, POC    Collection Time: 11/06/19  9:30 PM   Result Value Ref Range    Glucose (POC) 107 70 - 110 mg/dL   CARDIAC PANEL,(CK, CKMB & TROPONIN)    Collection Time: 11/06/19  9:33 PM   Result Value Ref Range    CK 1,178 (H) 39 - 308 U/L    CK - MB 9.1 (H) <3.6 ng/ml    CK-MB Index 0.8 0.0 - 4.0 %    Troponin-I, QT 0.28 (H) 0.0 - 0.045 NG/ML   GLUCOSE, POC    Collection Time: 11/06/19 10:16 PM   Result Value Ref Range    Glucose (POC) 88 70 - 110 mg/dL   GLUCOSE, POC    Collection Time: 11/06/19 11:50 PM   Result Value Ref Range    Glucose (POC) 107 70 - 110 mg/dL   GLUCOSE, POC    Collection Time: 11/07/19 12:59 AM   Result Value Ref Range    Glucose (POC) 102 70 - 110 mg/dL   GLUCOSE, POC    Collection Time: 11/07/19  2:04 AM   Result Value Ref Range    Glucose (POC) 154 (H) 70 - 773 mg/dL   METABOLIC PANEL, BASIC    Collection Time: 11/07/19  3:26 AM   Result Value Ref Range    Sodium 138 136 - 145 mmol/L    Potassium 4.4 3.5 - 5.5 mmol/L    Chloride 107 100 - 111 mmol/L    CO2 27 21 - 32 mmol/L    Anion gap 4 3.0 - 18 mmol/L    Glucose 114 (H) 74 - 99 mg/dL    BUN 7 7.0 - 18 MG/DL    Creatinine 1.08 0.6 - 1.3 MG/DL    BUN/Creatinine ratio 6 (L) 12 - 20      GFR est AA >60 >60 ml/min/1.73m2    GFR est non-AA >60 >60 ml/min/1.73m2    Calcium 9.3 8.5 - 10.1 MG/DL   MAGNESIUM    Collection Time: 11/07/19  3:26 AM   Result Value Ref Range    Magnesium 2.1 1.6 - 2.6 mg/dL   PHOSPHORUS    Collection Time: 11/07/19  3:26 AM   Result Value Ref Range    Phosphorus 1.5 (L) 2.5 - 4.9 MG/DL   CBC WITH AUTOMATED DIFF    Collection Time: 11/07/19  3:26 AM   Result Value Ref Range    WBC 12.8 4.6 - 13.2 K/uL    RBC 5.10 4.70 - 5.50 M/uL    HGB 15.2 13.0 - 16.0 g/dL    HCT 43.0 36.0 - 48.0 %    MCV 84.3 74.0 - 97.0 FL    MCH 29.8 24.0 - 34.0 PG    MCHC 35.3 31.0 - 37.0 g/dL    RDW 14.5 11.6 - 14.5 %    PLATELET 419 536 - 855 K/uL    MPV 10.8 9.2 - 11.8 FL    NEUTROPHILS 87 (H) 40 - 73 %    LYMPHOCYTES 10 (L) 21 - 52 %    MONOCYTES 3 3 - 10 %    EOSINOPHILS 0 0 - 5 %    BASOPHILS 0 0 - 2 %    ABS. NEUTROPHILS 11.2 (H) 1.8 - 8.0 K/UL    ABS. LYMPHOCYTES 1.3 0.9 - 3.6 K/UL    ABS. MONOCYTES 0.4 0.05 - 1.2 K/UL    ABS. EOSINOPHILS 0.0 0.0 - 0.4 K/UL    ABS.  BASOPHILS 0.0 0.0 - 0.1 K/UL    DF AUTOMATED     LIPID PANEL    Collection Time: 11/07/19  3:26 AM   Result Value Ref Range    LIPID PROFILE          Cholesterol, total 146 <200 MG/DL    Triglyceride 53 <150 MG/DL    HDL Cholesterol 79 (H) 40 - 60 MG/DL    LDL, calculated 56.4 0 - 100 MG/DL    VLDL, calculated 10.6 MG/DL    CHOL/HDL Ratio 1.8 0 - 5.0     CARDIAC PANEL,(CK, CKMB & TROPONIN)    Collection Time: 11/07/19  3:26 AM   Result Value Ref Range    CK 1,649 (H) 39 - 308 U/L    CK - MB 12.8 (H) <3.6 ng/ml    CK-MB Index 0.8 0.0 - 4.0 %    Troponin-I, QT 0.10 (H) 0.0 - 0.045 NG/ML   GLUCOSE, POC    Collection Time: 11/07/19  3:36 AM   Result Value Ref Range    Glucose (POC) 119 (H) 70 - 110 mg/dL   GLUCOSE, POC    Collection Time: 11/07/19  4:21 AM   Result Value Ref Range    Glucose (POC) 121 (H) 70 - 110 mg/dL   GLUCOSE, POC    Collection Time: 11/07/19  5:28 AM   Result Value Ref Range    Glucose (POC) 137 (H) 70 - 110 mg/dL   GLUCOSE, POC    Collection Time: 11/07/19  6:10 AM   Result Value Ref Range    Glucose (POC) 161 (H) 70 - 110 mg/dL   GLUCOSE, POC    Collection Time: 11/07/19  7:12 AM   Result Value Ref Range    Glucose (POC) 133 (H) 70 - 110 mg/dL   GLUCOSE, POC    Collection Time: 11/07/19  8:07 AM   Result Value Ref Range    Glucose (POC) 128 (H) 70 - 110 mg/dL   EKG, 12 LEAD, SUBSEQUENT    Collection Time: 11/07/19  9:05 AM   Result Value Ref Range    Ventricular Rate 57 BPM    Atrial Rate 57 BPM    P-R Interval 220 ms    QRS Duration 110 ms    Q-T Interval 394 ms    QTC Calculation (Bezet) 383 ms    Calculated P Axis 65 degrees    Calculated R Axis 80 degrees    Calculated T Axis 71 degrees    Diagnosis       Sinus bradycardia with 1st degree AV block  Incomplete right bundle branch block  Nonspecific ST abnormality  Abnormal ECG  When compared with ECG of 06-NOV-2019 18:05,  No significant change was found  Confirmed by John Stephenson MD, Shubham Nils ([id number]) on 11/7/2019 5:15:00 PM     CARDIAC PANEL,(CK, CKMB & TROPONIN)    Collection Time: 11/07/19  9:30 AM   Result Value Ref Range    CK 1,929 (H) 39 - 308 U/L    CK - MB 11.7 (H) <3.6 ng/ml    CK-MB Index 0.6 0.0 - 4.0 %    Troponin-I, QT 0.05 (H) 0.0 - 0.045 NG/ML   ECHO ADULT COMPLETE    Collection Time: 11/07/19  9:54 AM   Result Value Ref Range    LA Volume 30.79 18 - 58 mL    LV E' Lateral Velocity 14.26 cm/s    Tapse 1.77 1.5 - 2.0 cm    Ao Root D 2.23 cm    LVIDd 3.93 (A) 4.2 - 5.9 cm    LVPWd 1.07 (A) 0.6 - 1.0 cm    LVIDs 3.21 cm    IVSd 0.64 0.6 - 1.0 cm    LV ED Vol A2C 85.4 mL    LV ES Vol A4C 33.1 mL    LV ES Vol BP 36.4 22 - 58 mL    LVOT d 1.75 cm    LVOT Peak Velocity 95.98 cm/s    LVOT Peak Gradient 3.7 mmHg    LVOT VTI 17.83 cm    MV A Samson 63.88 cm/s    MV E Samson 51.40 cm/s    MV E/A 0.80     BP EF 56.6 55 - 100 %    LV Ejection Fraction MOD 4C 60 %    LV Ejection Fraction MOD 2C 55 %    LA Vol 4C 23.72 18 - 58 mL    LA Vol 2C 23.75 18 - 58 mL    LA Area 4C 12.2 cm2    LV Mass .7 88 - 224 g    LV Mass AL Index 54.5 49 - 115 g/m2    E/E' lateral 3.60     LV ES Vol A2C 38.3 mL    LVES Vol Index BP 18.1 mL/m2    LV ED Vol A4C 82.3 mL    LVED Vol Index BP 41.7 mL/m2    Mitral Valve E Wave Deceleration Time 180.1 ms    Triscuspid Valve Regurgitation Peak Gradient 10.8 mmHg    LV ED Vol BP 84.0 67 - 155 ml    TR Max Velocity 164.34 cm/s    LA Vol Index 15.30 16 - 28 ml/m2    LA Vol Index 11.80 16 - 28 ml/m2    LA Vol Index 11.78 16 - 28 ml/m2    LVED Vol Index A4C 40.9 mL/m2    LVED Vol Index A2C 42.4 mL/m2    LVES Vol Index A4C 16.4 mL/m2    LVES Vol Index A2C 19.0 mL/m2    Left Ventricular Fractional Shortening by 2D 60.631778453 %    Left Ventricular Outflow Tract Mean Gradient 2.6445856258185 mmHg    Left Ventricular Outflow Tract Mean Velocity 6.00457988346244 cm/s    Mitral Valve Deceleration [place] 4.4379194292881     Left Ventricular End Diastolic Volume by Teichholz Method 75.340141210 mL    Left Ventricular End Systolic Volume by Teichholz Method 66.283920124 mL    Left Ventricular Stroke Volume by 2-D Biplane-MOD 85.268221190 mL    Left Ventricular Stroke Volume by 2-D Single Plane- MOD 60.589223467 mL    Left Ventricular Stroke Volume by Teichholz Method 66.303709451 mL    Left Ventricular Stroke Volume by 2-D Single Plane- MOD 40.995546490 mL   GLUCOSE, POC    Collection Time: 11/07/19 10:10 AM   Result Value Ref Range    Glucose (POC) 145 (H) 70 - 110 mg/dL   GLUCOSE, POC    Collection Time: 11/07/19 11:10 AM   Result Value Ref Range    Glucose (POC) 105 70 - 110 mg/dL   GLUCOSE, POC    Collection Time: 11/07/19 11:47 AM   Result Value Ref Range    Glucose (POC) 100 70 - 110 mg/dL   GLUCOSE, POC    Collection Time: 11/07/19 12:50 PM   Result Value Ref Range    Glucose (POC) 114 (H) 70 - 110 mg/dL   GLUCOSE, POC    Collection Time: 11/07/19  1:56 PM   Result Value Ref Range    Glucose (POC) 89 70 - 110 mg/dL   GLUCOSE, POC    Collection Time: 11/07/19  3:20 PM   Result Value Ref Range    Glucose (POC) 125 (H) 70 - 110 mg/dL   GLUCOSE, POC    Collection Time: 11/07/19  4:37 PM   Result Value Ref Range    Glucose (POC) 149 (H) 70 - 110 mg/dL   GLUCOSE, POC    Collection Time: 11/07/19  5:47 PM   Result Value Ref Range    Glucose (POC) 159 (H) 70 - 110 mg/dL   GLUCOSE, POC    Collection Time: 11/07/19  6:41 PM   Result Value Ref Range    Glucose (POC) 101 70 - 110 mg/dL   GLUCOSE, POC    Collection Time: 11/07/19  8:08 PM   Result Value Ref Range    Glucose (POC) 84 70 - 110 mg/dL   GLUCOSE, POC    Collection Time: 11/07/19  9:27 PM   Result Value Ref Range    Glucose (POC) 107 70 - 110 mg/dL   GLUCOSE, POC    Collection Time: 11/07/19 11:57 PM   Result Value Ref Range    Glucose (POC) 131 (H) 70 - 110 mg/dL   GLUCOSE, POC    Collection Time: 11/08/19  2:11 AM   Result Value Ref Range    Glucose (POC) 86 70 - 110 mg/dL   GLUCOSE, POC    Collection Time: 11/08/19  4:08 AM   Result Value Ref Range    Glucose (POC) 129 (H) 70 - 302 mg/dL   METABOLIC PANEL, BASIC    Collection Time: 11/08/19  6:06 AM   Result Value Ref Range    Sodium 138 136 - 145 mmol/L    Potassium 3.8 3.5 - 5.5 mmol/L    Chloride 104 100 - 111 mmol/L    CO2 28 21 - 32 mmol/L    Anion gap 6 3.0 - 18 mmol/L    Glucose 113 (H) 74 - 99 mg/dL    BUN 12 7.0 - 18 MG/DL    Creatinine 1.44 (H) 0.6 - 1.3 MG/DL    BUN/Creatinine ratio 8 (L) 12 - 20      GFR est AA >60 >60 ml/min/1.73m2    GFR est non-AA 56 (L) >60 ml/min/1.73m2    Calcium 8.9 8.5 - 10.1 MG/DL   CBC WITH AUTOMATED DIFF    Collection Time: 11/08/19  6:06 AM   Result Value Ref Range    WBC 9.3 4.6 - 13.2 K/uL    RBC 5.03 4.70 - 5.50 M/uL    HGB 15.1 13.0 - 16.0 g/dL    HCT 42.9 36.0 - 48.0 %    MCV 85.3 74.0 - 97.0 FL    MCH 30.0 24.0 - 34.0 PG    MCHC 35.2 31.0 - 37.0 g/dL    RDW 14.4 11.6 - 14.5 %    PLATELET 244 219 - 604 K/uL    MPV 10.5 9.2 - 11.8 FL    NEUTROPHILS 48 40 - 73 %    LYMPHOCYTES 41 21 - 52 %    MONOCYTES 11 (H) 3 - 10 %    EOSINOPHILS 0 0 - 5 %    BASOPHILS 0 0 - 2 %    ABS. NEUTROPHILS 4.5 1.8 - 8.0 K/UL    ABS. LYMPHOCYTES 3.8 (H) 0.9 - 3.6 K/UL    ABS. MONOCYTES 1.0 0.05 - 1.2 K/UL    ABS. EOSINOPHILS 0.0 0.0 - 0.4 K/UL    ABS.  BASOPHILS 0.0 0.0 - 0.1 K/UL    DF AUTOMATED     GLUCOSE, POC    Collection Time: 11/08/19  6:06 AM   Result Value Ref Range    Glucose (POC) 115 (H) 70 - 110 mg/dL   GLUCOSE, POC    Collection Time: 11/08/19  8:36 AM   Result Value Ref Range    Glucose (POC) 81 70 - 110 mg/dL   GLUCOSE, POC    Collection Time: 11/08/19 10:33 AM   Result Value Ref Range    Glucose (POC) 104 70 - 110 mg/dL   GLUCOSE, POC    Collection Time: 11/08/19  1:09 PM   Result Value Ref Range    Glucose (POC) 134 (H) 70 - 110 mg/dL       Signed By: Jaylon Geiger MD     November 8, 2019

## 2019-11-08 NOTE — PROGRESS NOTES
conducted an initial consultation and Spiritual Assessment for Altaf Tavares, who is a 29 y.o.,male. Patients Primary Language is: [place]. According to the patients EMR Pentecostal Affiliation is: Other. The reason the Patient came to the hospital is:   Patient Active Problem List    Diagnosis Date Noted    Hypoglycemia 11/06/2019    Adverse effect of over-the-counter medication 11/06/2019    Elevated troponin 11/06/2019    Elevated CK 11/06/2019    Elevated CK-MB level 11/06/2019    Seizure-like activity ([address] 11/06/2019        The  provided the following Interventions:  Initiated a relationship of care and support. Explored issues of randall, spirituality and/or Baptist needs while hospitalized. Listened empathically. Provided information about Spiritual Care Services. Offered prayer and assurance of continued prayers on patient's behalf. Chart reviewed. The following outcomes were achieved:  Patient shared some information about their medical narrative and spiritual journey/beliefs. Patient processed feeling about current hospitalization. Patient expressed gratitude for the 's visit. Assessment:  Patient did not indicate any spiritual or Baptist issues which require Spiritual Care Services interventions at this time. Patient does not have any Baptist/cultural needs that will affect patients preferences in health care. Plan:  Chaplains will continue to follow and will provide pastoral care on an as needed or requested basis.  recommends bedside caregivers page  on duty if patient shows signs of acute spiritual or emotional distress.     [id number] [place] [facility]  [phone]

## 2019-11-09 VITALS
RESPIRATION RATE: 16 BRPM | OXYGEN SATURATION: 100 % | DIASTOLIC BLOOD PRESSURE: 99 MMHG | WEIGHT: 175 LBS | SYSTOLIC BLOOD PRESSURE: 137 MMHG | HEART RATE: 59 BPM | TEMPERATURE: 98.4 F | BODY MASS INDEX: 23.7 KG/M2 | HEIGHT: 72 IN

## 2019-11-09 LAB
ANION GAP SERPL CALC-SCNC: 7 MMOL/L (ref 3–18)
BASOPHILS # BLD: 0 K/UL (ref 0–0.1)
BASOPHILS NFR BLD: 0 % (ref 0–2)
BUN SERPL-MCNC: 15 MG/DL (ref 7–18)
BUN/CREAT SERPL: 13 (ref 12–20)
CALCIUM SERPL-MCNC: 8.4 MG/DL (ref 8.5–10.1)
CHLORIDE SERPL-SCNC: 106 MMOL/L (ref 100–111)
CK SERPL-CCNC: 1191 U/L (ref 39–308)
CO2 SERPL-SCNC: 28 MMOL/L (ref 21–32)
CREAT SERPL-MCNC: 1.17 MG/DL (ref 0.6–1.3)
DIFFERENTIAL METHOD BLD: ABNORMAL
EOSINOPHIL # BLD: 0.1 K/UL (ref 0–0.4)
EOSINOPHIL NFR BLD: 1 % (ref 0–5)
ERYTHROCYTE [DISTWIDTH] IN BLOOD BY AUTOMATED COUNT: 14.2 % (ref 11.6–14.5)
GLUCOSE BLD STRIP.AUTO-MCNC: 65 MG/DL (ref 70–110)
GLUCOSE BLD STRIP.AUTO-MCNC: 78 MG/DL (ref 70–110)
GLUCOSE BLD STRIP.AUTO-MCNC: 79 MG/DL (ref 70–110)
GLUCOSE BLD STRIP.AUTO-MCNC: 89 MG/DL (ref 70–110)
GLUCOSE SERPL-MCNC: 62 MG/DL (ref 74–99)
HCT VFR BLD AUTO: 39.3 % (ref 36–48)
HGB BLD-MCNC: 13.5 G/DL (ref 13–16)
LYMPHOCYTES # BLD: 3.5 K/UL (ref 0.9–3.6)
LYMPHOCYTES NFR BLD: 54 % (ref 21–52)
MCH RBC QN AUTO: 29.2 PG (ref 24–34)
MCHC RBC AUTO-ENTMCNC: 34.4 G/DL (ref 31–37)
MCV RBC AUTO: 84.9 FL (ref 74–97)
MONOCYTES # BLD: 0.7 K/UL (ref 0.05–1.2)
MONOCYTES NFR BLD: 11 % (ref 3–10)
NEUTS SEG # BLD: 2.2 K/UL (ref 1.8–8)
NEUTS SEG NFR BLD: 34 % (ref 40–73)
PLATELET # BLD AUTO: 258 K/UL (ref 135–420)
PMV BLD AUTO: 10.5 FL (ref 9.2–11.8)
POTASSIUM SERPL-SCNC: 3.4 MMOL/L (ref 3.5–5.5)
RBC # BLD AUTO: 4.63 M/UL (ref 4.7–5.5)
SODIUM SERPL-SCNC: 141 MMOL/L (ref 136–145)
WBC # BLD AUTO: 6.5 K/UL (ref 4.6–13.2)

## 2019-11-09 PROCEDURE — 94762 N-INVAS EAR/PLS OXIMTRY CONT: CPT

## 2019-11-09 PROCEDURE — 82962 GLUCOSE BLOOD TEST: CPT

## 2019-11-09 PROCEDURE — 74011000258 HC RX REV CODE- 258: Performed by: INTERNAL MEDICINE

## 2019-11-09 PROCEDURE — 36415 COLL VENOUS BLD VENIPUNCTURE: CPT

## 2019-11-09 PROCEDURE — 74011250636 HC RX REV CODE- 250/636: Performed by: INTERNAL MEDICINE

## 2019-11-09 PROCEDURE — 80048 BASIC METABOLIC PNL TOTAL CA: CPT

## 2019-11-09 PROCEDURE — 82550 ASSAY OF CK (CPK): CPT

## 2019-11-09 PROCEDURE — 85025 COMPLETE CBC W/AUTO DIFF WBC: CPT

## 2019-11-09 RX ADMIN — SODIUM CHLORIDE, SODIUM ACETATE ANHYDROUS, SODIUM GLUCONATE, POTASSIUM CHLORIDE, AND MAGNESIUM CHLORIDE 125 ML: 526; 222; 502; 37; 30 INJECTION, SOLUTION INTRAVENOUS at 04:21

## 2019-11-09 RX ADMIN — ENOXAPARIN SODIUM 40 MG: 40 INJECTION SUBCUTANEOUS at 10:50

## 2019-11-09 RX ADMIN — SODIUM CHLORIDE, SODIUM ACETATE ANHYDROUS, SODIUM GLUCONATE, POTASSIUM CHLORIDE, AND MAGNESIUM CHLORIDE: 526; 222; 502; 37; 30 INJECTION, SOLUTION INTRAVENOUS at 10:51

## 2019-11-09 NOTE — PROGRESS NOTES
Discharge:    Patient discharged home today (11/9/2019) with family assistance. Family also transported him home at the time of discharge. There were no care management concerns regarding his discharge. Lemmie M. Juanetta Rinne MSW  Care Manager  Pager#: [phone]

## 2019-11-09 NOTE — DISCHARGE INSTRUCTIONS
Patient Education   Patient Education        Rhabdomyolysis: Care Instructions  Your Care Instructions    When you have rhabdomyolysis (say \"saa-zpp-az-AH-chandan-suss\"), dying muscle cells cause toxins to build up in the blood. If not treated, it can cause life-threatening damage to the body's organs. It can be caused by many things, such as severe muscle injury, some medicines (like statins), the flu, and certain blood infections. Symptoms may include weak muscles, pain, stiffness, fever, and nausea. Your urine may also be dark. You will get treatment in the hospital. If possible, the doctor will stop the cause of muscle cell death. The doctor will take steps to protect your organs. You may have to stop taking certain medicines if they are the cause of the problem. You will also get treatment to help the kidneys remove the toxins from your blood. This includes plenty of fluids. You may get fluids through a vein (by IV). You may also need dialysis. Follow-up care is a key part of your treatment and safety. Be sure to make and go to all appointments, and call your doctor if you are having problems. It's also a good idea to know your test results and keep a list of the medicines you take. How can you care for yourself at home? · Take pain medicines exactly as directed. ? If the doctor gave you a prescription medicine for pain, take it as prescribed. ? If you are not taking a prescription pain medicine, ask your doctor if you can take an over-the-counter medicine. · Talk to your doctor about whether you need to stop taking any medicines. Follow your doctor's instructions about stopping medicines. · Drink plenty of fluids, enough so that your urine is light yellow or clear like water. If you have kidney, heart, or liver disease and have to limit fluids, talk with your doctor before you increase the amount of fluids you drink. When should you call for help?   Call your doctor now or seek immediate medical care if:    · You have new or worse muscle pain.     · You have less urine than normal or no urine.     · You have new swelling in your arms or feet.     · You have blood in your urine.    Watch closely for changes in your health, and be sure to contact your doctor if you do not get better as expected. Where can you learn more? Go to http://jacques-bolivar.info/. Enter F129 in the search box to learn more about \"Rhabdomyolysis: Care Instructions. \"  Current as of: October 31, 2018  Content Version: 12.2  © 6862-7715 [employer]. Care instructions adapted under license by [employer] (which disclaims liability or warranty for this information). If you have questions about a medical condition or this instruction, always ask your healthcare professional. [address] any warranty or liability for your use of this information. Hypoglycemia: Care Instructions  Your Care Instructions    Hypoglycemia means that your blood sugar is low and your body is not getting enough fuel. Some people get low blood sugar from not eating often enough. Some medicines to treat diabetes can cause low blood sugar. People who have had surgery on their stomachs or intestines may get hypoglycemia. Problems with the pancreas, kidneys, or liver also can cause low blood sugar. A snack or drink with sugar in it will raise your blood sugar and should ease your symptoms right away. Your doctor may recommend that you change or stop your medicines until you can get your blood sugar levels under control. In the long run, you may need to change your diet and eating habits so that you get enough fuel for your body throughout the day. Follow-up care is a key part of your treatment and safety. Be sure to make and go to all appointments, and call your doctor if you are having problems. It's also a good idea to know your test results and keep a list of the medicines you take.   How can you care for yourself at home? · Learn to recognize the early signs of low blood sugar. Signs include:  ? Nausea. ? Hunger. ? Feeling nervous, irritable, or shaky. ? Cold, clammy, wet skin. ? Sweating (when you are not exercising). ? A fast heartbeat.  ? Numbness or tingling of the fingertips or lips. · If you feel an episode of low blood sugar coming on, drink fruit juice or sugared (not diet) soda, or eat sugar in the form of candy, cubes, or tablets. [employer]et are another [employer]. · Eat small, frequent meals so that you do not get too hungry between meals. · Balance extra exercise with eating more. · Keep a written record of your low blood sugar episodes, including when you last ate and what you ate, so that you can learn what causes your blood sugar to drop. · Make sure your family, friends, and coworkers know the symptoms of low blood sugar and know what to do to get your sugar level up. · Wear medical alert jewelry that lists your condition. You can buy this at most [employer]. When should you call for help? Call 911 anytime you think you may need emergency care. For example, call if:    · You passed out (lost consciousness).     · You are confused or cannot think clearly.     · Your blood sugar is very high or very low.    Watch closely for changes in your health, and be sure to contact your doctor if:    · Your blood sugar stays outside the level your doctor set for you.     · You have any problems. Where can you learn more? Go to http://jacques-bolivar.info/. Enter L076 in the search box to learn more about \"Hypoglycemia: Care Instructions. \"  Current as of: April 16, 2019  Content Version: 12.2  © 5138-3679 [employer]. Care instructions adapted under license by [employer] (which disclaims liability or warranty for this information).  If you have questions about a medical condition or this instruction, always ask your healthcare professional. [employer] disclaims any warranty or liability for your use of this information.

## 2019-11-09 NOTE — DISCHARGE SUMMARY
Discharge Summary    Patient: Dahlia Carroll MRN: [id number]  CSN: [id number]    YOB: 1985  Age: 29 y.o. Sex: male    DOA: 11/6/2019 LOS:  LOS: 3 days   Discharge Date:      Admission Diagnoses: Hypoglycemia [E16.2]  Adverse effect of over-the-counter medication [T50.905A]    Discharge Diagnoses:    Hypoglycemia   Adverse effect of OTC meds  PARRISH   Rhabdomyolysis       Discharge Condition: Stable    Consults: Nephrology    PHYSICAL EXAM  Visit Vitals  /86 (BP 1 Location: Left arm, BP Patient Position: At rest;Supine)   Pulse (!) 56   Temp 97.9 °F (36.6 °C)   Resp 11   Ht 6' (1.829 m)   Wt 79.4 kg (175 lb)   SpO2 100%   BMI 23.73 kg/m²       General: Alert, cooperative, no acute distress    HEENT: PERRLA, EOMI. Anicteric sclerae. Lungs:  CTA Bilaterally. No Wheezing/Rhonchi/Rales. Heart:  Regular rate and Rhythm. Abdomen: Soft, Non distended, Non tender. + Bowel sounds. Extremities: No edema/ cyanosis/ clubbing  Psych:   Good insight. Not anxious or agitated. Neurologic:  AA oriented X 3. Moves all extremities. HPI:  Dahlia Carroll is a 29 y.o.  male who has no PMH except for tobacco use until 3 weeks ago and marijuana use 1 cigarette daily  Patient presented to ER with altered mental status, stiffness heavy sweating and was found to have a fingerstick of 45  As per patient's girlfriend, she last heard from patient around 12 noon. she tried to contact him afterwards but she received no response. she got worried and went to the house to find him on the floor unresponsive, staring and aphasic.patient was also shaking badly arms were stiff and fingers were clenching. Patient received dextrose injection and was brought to ER  In ER patient was more alert and oriented and almost back to baseline. he continued to be hypoglycemic and was placed on D10 if continued to be around 100 FS  Patient states that he took some male enhancement OTC called V8 2 days ago and since that time he used to wake up in the middle the night heavily sweating with palpitations and needs to eat to feel better  Patient cannot really recall what happened earlier today telemetry he was found by his girlfriend and EMS and brought to ER  In ER patient was found also to have mildly elevated troponin of 0.11 and trended up to 0.26 and has nonspecific changes on EKG but denies chest pain shortness of breath at this time  Patient also denies drug abuse except for marijuana and does not have any other complaints except for his left arm swelling secondary to infiltrated IV earlier      Hospital Course:   Pt admitted to the hosp with hypoglycemia   He took at OTC med - V8 - ? Male enhancing pill - was found with a Blood sugar of 30 - ? Seizure   Brought to the ER - started on IVF - D10W & also hydrocortisone   Poison control contacted - advised to start Octreotide & stop hydrocortisone   BS have improved - no further episodes of hypoglycemia   He is doing much better now   At baseline - ok to discharge home       Imaging:  None       Procedures:   None         Discharge Medications: There are no discharge medications for this patient.       Current Facility-Administered Medications:     electrolyte-r (NORMOSOL R) infusion, , IntraVENous, CONTINUOUS, Munir Little MD, Last Rate: 125 mL/hr at 11/09/19 1051    glucose chewable tablet 16 g, 4 Tab, Oral, PRN, George Campbell MD    glucagon (GLUCAGEN) injection 1 mg, 1 mg, IntraMUSCular, PRN, George Campbell MD    dextrose 10% infusion 125-250 mL, 125-250 mL, IntraVENous, PRN, George Campbell MD    acetaminophen (TYLENOL) tablet 650 mg, 650 mg, Oral, Q6H PRN, George Thurman MD    naloxone [facility]) injection 0.4 mg, 0.4 mg, IntraVENous, PRN, George Campbell MD    ondansetron (ZOFRAN) injection 4 mg, 4 mg, IntraVENous, Q6H PRN, George Campbell MD    docusate sodium (COLACE) capsule 100 mg, 100 mg, Oral, BID PRN, Humphrey Polka, Elias Wong MD    enoxaparin (LOVENOX) injection 40 mg, 40 mg, SubCUTAneous, Q24H, Gobraeel, Elias Wong MD, 40 mg at 11/09/19 1050  · It is important that you take the medication exactly as they are prescribed. · Keep your medication in the bottles provided by the pharmacist and keep a list of the medication names, dosages, and times to be taken in your wallet. · Do not take other medications without consulting your doctor.          Minutes spent on discharge: 42 minutes spent coordinating this discharge (review instructions/follow-up, prescriptions, preparing report for sign off)    Sanchez Stallworth MD  11/9/2019 12:21 PM

## 2019-11-09 NOTE — PROGRESS NOTES
To whom it may concern     Mr Daniel Michaels was admitted to the hospital on 11/6 - discharged on 11/9  He is ok to resume his normal activities including work on 11/11